# Patient Record
Sex: FEMALE | Race: WHITE | NOT HISPANIC OR LATINO | Employment: FULL TIME | URBAN - METROPOLITAN AREA
[De-identification: names, ages, dates, MRNs, and addresses within clinical notes are randomized per-mention and may not be internally consistent; named-entity substitution may affect disease eponyms.]

---
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
## 2019-01-08 ENCOUNTER — OFFICE VISIT (OUTPATIENT)
Dept: OBSTETRICS AND GYNECOLOGY | Facility: CLINIC | Age: 19
End: 2019-01-08

## 2019-01-08 VITALS
DIASTOLIC BLOOD PRESSURE: 86 MMHG | HEART RATE: 85 BPM | HEIGHT: 65 IN | WEIGHT: 153 LBS | BODY MASS INDEX: 25.49 KG/M2 | SYSTOLIC BLOOD PRESSURE: 138 MMHG

## 2019-01-08 DIAGNOSIS — Z30.011 ENCOUNTER FOR INITIAL PRESCRIPTION OF CONTRACEPTIVE PILLS: ICD-10-CM

## 2019-01-08 DIAGNOSIS — N92.6 IRREGULAR MENSES: ICD-10-CM

## 2019-01-08 DIAGNOSIS — Z11.3 SCREEN FOR STD (SEXUALLY TRANSMITTED DISEASE): Primary | ICD-10-CM

## 2019-01-08 PROCEDURE — 99214 OFFICE O/P EST MOD 30 MIN: CPT | Performed by: OBSTETRICS & GYNECOLOGY

## 2019-01-08 RX ORDER — NORGESTIMATE AND ETHINYL ESTRADIOL 0.25-0.035
1 KIT ORAL DAILY
Qty: 28 TABLET | Refills: 12 | Status: SHIPPED | OUTPATIENT
Start: 2019-01-08 | End: 2020-07-15

## 2019-01-08 NOTE — PROGRESS NOTES
Subjective   Obie Jensen is a 18 y.o. female.   CC: pt here for irregular cycles.   History of Present Illness   Patient was last seen in June 2017 for similar complaints.  She states that at that time she was started on birth control pills, but she only took them for a month.  After that her periods self regulated and had been normal until 12/2018.  She reports that she had a normal period at the beginning of December, and then she bled again for about another 10 days in the middle of the month.  Bleeding stopped about 2 weeks ago.  She was not having any pain with the bleeding.  Patient is sexually active.  She occasionally uses condoms.  She denies any history of sexually transmitted diseases.  She is interested in starting on birth control again.  Patient denies any headache or visual disturbances.  No nipple discharge.  She denies hot or cold intolerance.  She had a significant weight loss around the time of her last visit, but her weight has been stable for the last year and a half.  She does it after her last visit in June 2017, she did follow-up with her primary care physician who checked thyroid function and hormones which were normal.    Current Outpatient Medications on File Prior to Visit   Medication Sig   • [DISCONTINUED] norgestimate-ethinyl estradiol (TRINESSA, 28,) 0.18/0.215/0.25 MG-35 MCG per tablet Take 1 tablet by mouth Daily.     No current facility-administered medications on file prior to visit.        Allergies   Allergen Reactions   • Penicillins Unknown (See Comments)   • Latex Rash     The following portions of the patient's history were reviewed and updated as appropriate: allergies, current medications, past family history, past medical history, past social history, past surgical history and problem list.    Review of Systems  General: No fever or chills  Constitutional: No weight loss or gain, no hair loss  HENT: No headache, no hearing loss, no tinnitus  Eyes: normal vision, no eye  "pain  Lungs: No cough, no shortness of breath  Heart: No chest pain, no palpitations  Abdomen: No nausea, vomiting, constipation or diarrhea  : No dysuria, no hematuria  Skin: No rashes  Lymph: No swelling  Neuro: No parathesia, no weakness  Psych: Normal though content, no hallucinations, no SI/HI    Objective   Physical Exam  Vitals:    01/08/19 1457   BP: 138/86   Pulse: 85   Weight: 69.4 kg (153 lb)   Height: 165.1 cm (65\")     Gen: No acute distress, awake and oriented times three  Abdomen: soft, nontender, no masses or hernia, no hepatosplenomegaly, non distended, normoactive bowel sounds  Pelvic: Exam performed in the presence of a female chaperone  Patient has provided verbal consent to proceed with exam.  Normal external female genitalia, no lesions  Urethra: Normal meatus, no caruncle  Bladder: nontender  Vagina: No blood; mild amount white DC noted  Cervix: No cervical motion tenderness, no lesions, no active bleeding, nonfriable  Uterus: Anteverted, normal size and shape, nontender  Adnexa: No masses or tenderness  External anal exam: Normal appearance, no lesions or hemorrhoids  Rectal: Deferred  Psych: Good judgement and insight, normal affect and mood    Assessment/Plan   Diagnoses and all orders for this visit:    Screen for STD (sexually transmitted disease)  -     Chlamydia trachomatis, Neisseria gonorrhoeae, Trichomonas vaginalis, PCR - Swab, Vagina    Encounter for initial prescription of contraceptive pills  -     norgestimate-ethinyl estradiol (SPRINTEC 28) 0.25-35 MG-MCG per tablet; Take 1 tablet by mouth Daily.    Irregular menses  -     Chlamydia trachomatis, Neisseria gonorrhoeae, Trichomonas vaginalis, PCR - Swab, Vagina  -     norgestimate-ethinyl estradiol (SPRINTEC 28) 0.25-35 MG-MCG per tablet; Take 1 tablet by mouth Daily.    No obvious cause of her irregular menses.  This may just be normal variation in her cycle.  We will check cultures to exclude an infectious source, as the " patient is sexually active and occasionally only occasionally uses condoms.  We encouraged more regular condom use.  Patient like to start on birth control pills.  Risks, benefits, alternatives, and side effects were discussed.  Instructions for use were given to the patient.  She will start on Sprintec after her next menses.  We discussed a Sunday start.  Patient needs to return after she turns 19 for annual exam/Pap smear.

## 2019-01-10 ENCOUNTER — TELEPHONE (OUTPATIENT)
Dept: OBSTETRICS AND GYNECOLOGY | Facility: CLINIC | Age: 19
End: 2019-01-10

## 2019-01-10 LAB
C TRACH RRNA SPEC QL NAA+PROBE: NEGATIVE
N GONORRHOEA RRNA SPEC QL NAA+PROBE: NEGATIVE
T VAGINALIS RRNA VAG QL NAA+PROBE: NEGATIVE

## 2019-01-10 NOTE — TELEPHONE ENCOUNTER
Pt aware. RYAN      ----- Message from Yousif Guerrero MD sent at 1/10/2019  9:24 AM EST -----  Notify the patient that her gonorrhea and chlamydia tests were negative

## 2020-07-15 ENCOUNTER — OFFICE VISIT (OUTPATIENT)
Dept: OBSTETRICS AND GYNECOLOGY | Facility: CLINIC | Age: 20
End: 2020-07-15

## 2020-07-15 VITALS — BODY MASS INDEX: 21.99 KG/M2 | HEIGHT: 65 IN | WEIGHT: 132 LBS

## 2020-07-15 DIAGNOSIS — Z12.4 PAPANICOLAOU SMEAR: ICD-10-CM

## 2020-07-15 DIAGNOSIS — N92.6 IRREGULAR MENSES: Primary | ICD-10-CM

## 2020-07-15 DIAGNOSIS — Z11.3 SCREENING FOR STD (SEXUALLY TRANSMITTED DISEASE): ICD-10-CM

## 2020-07-15 LAB
B-HCG UR QL: NEGATIVE
INTERNAL NEGATIVE CONTROL: NEGATIVE
INTERNAL POSITIVE CONTROL: POSITIVE
Lab: NORMAL

## 2020-07-15 PROCEDURE — 81025 URINE PREGNANCY TEST: CPT | Performed by: NURSE PRACTITIONER

## 2020-07-15 PROCEDURE — 99214 OFFICE O/P EST MOD 30 MIN: CPT | Performed by: NURSE PRACTITIONER

## 2020-07-15 RX ORDER — NAPROXEN 500 MG/1
500 TABLET ORAL
COMMUNITY
Start: 2020-07-12 | End: 2020-07-26

## 2020-07-15 RX ORDER — HYDROCODONE BITARTRATE AND ACETAMINOPHEN 7.5; 325 MG/1; MG/1
TABLET ORAL
COMMUNITY
Start: 2020-05-22 | End: 2021-04-15

## 2020-07-15 NOTE — PROGRESS NOTES
"Chief Complaint   Patient presents with   • Establish Care     Annual exam. Pt also c/o irregular periods.         SUBJECTIVE:     Obie Jensen is a 20 y.o.  who presents with irregular menses. This is a new problem. LMP 18, started spotting 18. Initially states periods are months apart, further questioning reveals regular monthly periods lasting 3-4 days with frequent episodes of spotting. Denies pelvic or vaginal pain. Denies vaginal discharge, itching, burning, or dysuria.    Current every day smoker, denies hx of migraines, denies hx of DVT    She has previously used nexplanon and sprintec for contraception, however she is not interested in contraceptives at this time.    Using condoms for contraception    She denies any hx of STD    This is my first time meeting Obie Jensen      Past Medical History:   Diagnosis Date   • Weight loss, unintentional       Past Surgical History:   Procedure Laterality Date   • FOOT SURGERY Bilateral    • HAND SURGERY        Social History     Tobacco Use   • Smoking status: Current Every Day Smoker     Packs/day: 4.00     Types: Cigars   • Smokeless tobacco: Never Used   Substance Use Topics   • Alcohol use: No   • Drug use: No     OB History    Para Term  AB Living   0 0 0 0 0 0   SAB TAB Ectopic Molar Multiple Live Births   0 0 0   0          Review of Systems   Gastrointestinal: Negative for abdominal distention and abdominal pain.   Genitourinary: Positive for menstrual problem. Negative for dyspareunia, dysuria, genital sores, hematuria, pelvic pain, vaginal bleeding, vaginal discharge and vaginal pain.   Musculoskeletal: Negative for back pain and gait problem.   Neurological: Negative for dizziness and headaches.   Psychiatric/Behavioral: Negative for dysphoric mood.       OBJECTIVE:   Vitals:    07/15/20 1019   Weight: 59.9 kg (132 lb)   Height: 165.1 cm (65\")        Physical Exam   Constitutional: She is oriented to person, place, and " time. She appears well-developed and well-nourished.   HENT:   Head: Normocephalic and atraumatic.   Eyes: Pupils are equal, round, and reactive to light.   Neck: Normal range of motion. Neck supple. No thyromegaly present.   Cardiovascular: Normal rate, regular rhythm and normal heart sounds.   No murmur heard.  Pulmonary/Chest: Effort normal and breath sounds normal. No respiratory distress. She has no wheezes. Right breast exhibits no inverted nipple, no mass, no nipple discharge, no skin change and no tenderness. Left breast exhibits no inverted nipple, no mass, no nipple discharge, no skin change and no tenderness. No breast swelling, tenderness, discharge or bleeding. Breasts are symmetrical.   Abdominal: Soft. She exhibits no distension and no mass. There is no tenderness. There is no rebound and no guarding. No hernia. Hernia confirmed negative in the right inguinal area and confirmed negative in the left inguinal area.   Genitourinary: Uterus normal. No labial fusion. There is no rash, tenderness, lesion, injury or Bartholin's cyst on the right labia. There is no rash, tenderness, lesion, injury or Bartholin's cyst on the left labia.   Cervix is nulliparous. Cervix does not exhibit motion tenderness, discharge, friability, lesion, polyp, nabothian cyst, eversion or pinkness. Right adnexum displays no mass, no tenderness and no fullness. Right adnexum is present and palpable.Left adnexum displays no mass, no tenderness and no fullness. Left adnexum is present and palpable.Vagina exhibits no lesion and no loss of rugae. No erythema, tenderness or bleeding in the vagina. No foreign body in the vagina. No signs of injury around the vagina. No vaginal discharge found. No cystocele or rectocele present.  Musculoskeletal: Normal range of motion. She exhibits no edema.   Lymphadenopathy:     She has no cervical adenopathy.        Right: No inguinal adenopathy present.        Left: No inguinal adenopathy present.    Neurological: She is alert and oriented to person, place, and time. No cranial nerve deficit. Coordination normal.   Skin: Skin is warm and dry. No erythema.   Psychiatric: She has a normal mood and affect. Her behavior is normal. Judgment and thought content normal.   Vitals reviewed.      ASSESSMENT:   1) Irregular menses  2) Screening for STD    PLAN:   1. NuSwab + today, will all with results  2. TSH today  3. Pap smear collected today, no previous pap testing completed, reviewed recommendations with pt  4. Reviewed possible causes of intermenstrual spotting as well as treatments. Will rule out STD and thyroid issues. Will consider transvaginal u/s if no improvement in symptoms  5. Urine pregnancy test negative in office today  6.Obie Jensen  reports that she has been smoking cigars. She has been smoking about 4.00 packs per day. She has never used smokeless tobacco.. I have educated her on the risk of diseases from using tobacco products such as cancer, COPD and heart diease.   I advised her to quit and she is not willing to quit.  I spent 3  minutes counseling the patient.    Follow up: PRN no improvement in symptoms, PRN, and annually       Unique Shah, APRN  7/15/2020  10:27

## 2020-07-20 LAB
CONV .: NORMAL
CYTOLOGIST CVX/VAG CYTO: NORMAL
CYTOLOGY CVX/VAG DOC CYTO: NORMAL
CYTOLOGY CVX/VAG DOC THIN PREP: NORMAL
DX ICD CODE: NORMAL
HIV 1 & 2 AB SER-IMP: NORMAL
OTHER STN SPEC: NORMAL
STAT OF ADQ CVX/VAG CYTO-IMP: NORMAL

## 2020-07-24 ENCOUNTER — TELEPHONE (OUTPATIENT)
Dept: OBSTETRICS AND GYNECOLOGY | Facility: CLINIC | Age: 20
End: 2020-07-24

## 2020-07-24 LAB
A VAGINAE DNA VAG QL NAA+PROBE: ABNORMAL SCORE
BVAB2 DNA VAG QL NAA+PROBE: ABNORMAL SCORE
C ALBICANS DNA VAG QL NAA+PROBE: NEGATIVE
C GLABRATA DNA VAG QL NAA+PROBE: NEGATIVE
C TRACH DNA VAG QL NAA+PROBE: NEGATIVE
MEGA1 DNA VAG QL NAA+PROBE: ABNORMAL SCORE
N GONORRHOEA DNA VAG QL NAA+PROBE: NEGATIVE
T VAGINALIS DNA VAG QL NAA+PROBE: NEGATIVE

## 2020-07-24 RX ORDER — METRONIDAZOLE 500 MG/1
500 TABLET ORAL 2 TIMES DAILY
Qty: 14 TABLET | Refills: 0 | Status: SHIPPED | OUTPATIENT
Start: 2020-07-24 | End: 2020-07-31

## 2020-07-24 NOTE — TELEPHONE ENCOUNTER
----- Message from LEANDRA Bojorquez sent at 7/24/2020 12:40 PM EDT -----  Please let the pt know that her vaginal cultures returned +BV, I have sent a prescription to her pharmacy to treat. She should not drink alcohol while taking flagyl    Thanks

## 2021-04-15 ENCOUNTER — OFFICE VISIT (OUTPATIENT)
Dept: OBSTETRICS AND GYNECOLOGY | Facility: CLINIC | Age: 21
End: 2021-04-15

## 2021-04-15 VITALS
DIASTOLIC BLOOD PRESSURE: 70 MMHG | BODY MASS INDEX: 23.66 KG/M2 | HEIGHT: 65 IN | SYSTOLIC BLOOD PRESSURE: 120 MMHG | WEIGHT: 142 LBS

## 2021-04-15 DIAGNOSIS — N83.202 CYST OF LEFT OVARY: ICD-10-CM

## 2021-04-15 DIAGNOSIS — N73.0 ACUTE PID (PELVIC INFLAMMATORY DISEASE): ICD-10-CM

## 2021-04-15 DIAGNOSIS — A54.9 GONORRHEA: Primary | ICD-10-CM

## 2021-04-15 PROCEDURE — 99214 OFFICE O/P EST MOD 30 MIN: CPT | Performed by: NURSE PRACTITIONER

## 2021-04-15 NOTE — PROGRESS NOTES
Chief Complaint   Patient presents with   • Follow-up     e/r follow up for PID        SUBJECTIVE:     Obie Jensen is a 21 y.o.  who presents for f/u ED visit from 3/27/21 at [facility] I am able to see some of these records and have reviewed them with the patient. She was diagnosed with PID, gonorrhea, and left ovarian cyst. She was also dx with c-diff and UTI. She has no complaints today. She was treated with rocephin and doxycycline. She did not  and take doxycycline for PID. She denies any current pelvic pain, fevers, chills, N&V, denies dysuria, vaginal discharge or odor. WBC elevated in ED, but was normal at PCP f/u 21 This is a new problem. LMP 3/22/21    I reviewed the u/s results with the pt [id number] hemorrhagic left ovarian cyst. Discussed with pt that there is no indication to repeat the u/s with a cyst of this size and if we did repeat, I would prefer to wait 6-8 weeks from first u/s. She feels strongly about repeating this u/s and desires to do so in approx 4 weeks in our office.     She is unsure if her partner was tested and/or treated for gonorrhea, she has not been sexually active since positive result.    Past Medical History:   Diagnosis Date   • Weight loss, unintentional       Past Surgical History:   Procedure Laterality Date   • FOOT SURGERY Bilateral    • HAND SURGERY        Social History     Tobacco Use   • Smoking status: Current Every Day Smoker     Packs/day: 4.00     Types: Cigars   • Smokeless tobacco: Never Used   Substance Use Topics   • Alcohol use: No   • Drug use: No     OB History    Para Term  AB Living   0 0 0 0 0 0   SAB TAB Ectopic Molar Multiple Live Births   0 0 0   0          Review of Systems   Constitutional: Negative for chills, fatigue and fever.   Gastrointestinal: Negative for abdominal distention, abdominal pain, blood in stool, constipation, diarrhea, nausea and vomiting.   Genitourinary: Negative for dyspareunia, dysuria, flank pain,  "frequency, menstrual problem, pelvic pain, vaginal bleeding, vaginal discharge and vaginal pain.   Musculoskeletal: Negative for back pain and gait problem.   Skin: Negative for rash.   Neurological: Negative for dizziness and headaches.   Hematological: Does not bruise/bleed easily.   Psychiatric/Behavioral: Negative for behavioral problems.       OBJECTIVE:   Vitals:    04/15/21 1335   BP: 120/70   Weight: 64.4 kg (142 lb)   Height: 165.1 cm (65\")        Physical Exam  Vitals and nursing note reviewed.   Constitutional:       Appearance: Normal appearance.   HENT:      Head: Normocephalic and atraumatic.   Eyes:      Pupils: Pupils are equal, round, and reactive to light.   Cardiovascular:      Rate and Rhythm: Normal rate.   Pulmonary:      Effort: Pulmonary effort is normal.   Abdominal:      General: Abdomen is flat. There is no distension.      Palpations: Abdomen is soft. There is no mass.      Tenderness: There is no abdominal tenderness. There is no guarding.      Hernia: No hernia is present. There is no hernia in the left inguinal area or right inguinal area.   Genitourinary:     General: Normal vulva.      Exam position: Lithotomy position.      Pubic Area: No rash or pubic lice.       Labia:         Right: No rash, tenderness, lesion or injury.         Left: No rash, tenderness, lesion or injury.       Urethra: No prolapse, urethral pain, urethral swelling or urethral lesion.      Vagina: No signs of injury and foreign body. Vaginal discharge (thick, white, clumpy, consistent with yeast ) present. No erythema, tenderness, bleeding, lesions or prolapsed vaginal walls.      Cervix: No cervical motion tenderness, discharge, friability, lesion, erythema, cervical bleeding or eversion.      Uterus: Not deviated, not enlarged, not fixed, not tender and no uterine prolapse.       Adnexa:         Right: No mass, tenderness or fullness.          Left: No mass, tenderness or fullness.     Musculoskeletal:         " General: Normal range of motion.      Cervical back: Normal range of motion.   Lymphadenopathy:      Lower Body: No right inguinal adenopathy. No left inguinal adenopathy.   Skin:     General: Skin is warm and dry.   Neurological:      General: No focal deficit present.      Mental Status: She is alert and oriented to person, place, and time.      Cranial Nerves: No cranial nerve deficit.   Psychiatric:         Mood and Affect: Mood normal.         Behavior: Behavior normal.         Thought Content: Thought content normal.         Judgment: Judgment normal.         ASSESSMENT:   1) F/U ED  2) Gonorrhea  3) Left ovarian cyst  4) PID    PLAN:   Pelvic pain and symptoms all now resolved  NuSwab+ recollected  Discharge consistent with yeast noted, she denies and symptoms  Pelvic pain resolved, I reviewed recommendations surrounding indications for repeat u/s with pt, she desires to complete in 4 weeks  Reviewed torsion precautions, encouraged to call with any return of pelvic pain, fevers, chills, or N&V  Transvaginal u/s ordered for 4 weeks.  Encouraged condoms with intercourse     Follow up: 4 week and PRN    I have spent 35 min in face to face time with the patient and 35 min of this time was spent in counseling on the above stated issues and reviewing previous records.       Unique Shah, APRN  4/15/2021  13:46 EDT

## 2021-04-18 LAB
A VAGINAE DNA VAG QL NAA+PROBE: NORMAL SCORE
BVAB2 DNA VAG QL NAA+PROBE: NORMAL SCORE
C ALBICANS DNA VAG QL NAA+PROBE: NEGATIVE
C GLABRATA DNA VAG QL NAA+PROBE: NEGATIVE
C TRACH DNA VAG QL NAA+PROBE: NEGATIVE
MEGA1 DNA VAG QL NAA+PROBE: NORMAL SCORE
N GONORRHOEA DNA VAG QL NAA+PROBE: NEGATIVE
T VAGINALIS DNA VAG QL NAA+PROBE: NEGATIVE

## 2021-05-17 ENCOUNTER — TELEPHONE (OUTPATIENT)
Dept: OBSTETRICS AND GYNECOLOGY | Facility: CLINIC | Age: 21
End: 2021-05-17

## 2021-05-26 ENCOUNTER — TELEPHONE (OUTPATIENT)
Dept: OBSTETRICS AND GYNECOLOGY | Facility: CLINIC | Age: 21
End: 2021-05-26

## 2021-11-18 ENCOUNTER — OFFICE VISIT (OUTPATIENT)
Dept: OBSTETRICS AND GYNECOLOGY | Facility: CLINIC | Age: 21
End: 2021-11-18

## 2021-11-18 VITALS
HEART RATE: 62 BPM | DIASTOLIC BLOOD PRESSURE: 73 MMHG | WEIGHT: 153.6 LBS | SYSTOLIC BLOOD PRESSURE: 113 MMHG | BODY MASS INDEX: 25.56 KG/M2

## 2021-11-18 DIAGNOSIS — Z01.419 WOMEN'S ANNUAL ROUTINE GYNECOLOGICAL EXAMINATION: Primary | ICD-10-CM

## 2021-11-18 DIAGNOSIS — Z23 NEED FOR HPV VACCINATION: ICD-10-CM

## 2021-11-18 DIAGNOSIS — Z11.3 SCREENING FOR STD (SEXUALLY TRANSMITTED DISEASE): ICD-10-CM

## 2021-11-18 PROCEDURE — 3008F BODY MASS INDEX DOCD: CPT | Performed by: NURSE PRACTITIONER

## 2021-11-18 PROCEDURE — 2014F MENTAL STATUS ASSESS: CPT | Performed by: NURSE PRACTITIONER

## 2021-11-18 PROCEDURE — 96372 THER/PROPH/DIAG INJ SC/IM: CPT | Performed by: NURSE PRACTITIONER

## 2021-11-18 PROCEDURE — 90651 9VHPV VACCINE 2/3 DOSE IM: CPT | Performed by: NURSE PRACTITIONER

## 2021-11-18 PROCEDURE — 99395 PREV VISIT EST AGE 18-39: CPT | Performed by: NURSE PRACTITIONER

## 2021-11-18 NOTE — PROGRESS NOTES
GYN Annual Exam     Chief Complaint   Patient presents with   • Gynecologic Exam     annual. Pap- 2020       HPI    Obie Jensen is a 21 y.o. female who presents for annual well woman exam.  She is sexually active. Periods are regular every 28-30 days, lasting 5 days. Dysmenorrhea:none. Cyclic symptoms include none. No intermenstrual bleeding, spotting, or discharge. Performing SBE:occas      OB History        0    Para   0    Term   0       0    AB   0    Living   0       SAB   0    IAB   0    Ectopic   0    Molar        Multiple   0    Live Births                    LMP- 10/26/21  Current contraception: none  Last Pap- 2020 negative  History of abnormal Pap smear: no  History of STD-gonorrhea  Family history of uterine, colon or ovarian cancer: no  Family history of breast cancer: no  Gardasil Vaccine: no    Past Medical History:   Diagnosis Date   • Weight loss, unintentional        Past Surgical History:   Procedure Laterality Date   • FOOT SURGERY Bilateral    • HAND SURGERY         No current outpatient medications on file.    Allergies   Allergen Reactions   • Penicillins Unknown (See Comments) and Other (See Comments)     Mother has hx of pcn allergy, patient has never had pcn  Mother has hx of pcn allergy, patient has never had pcn  Other reaction(s): Unknown (See Comments)  Mother has hx of pcn allergy, patient has never had pcn   • Latex Rash       Social History     Tobacco Use   • Smoking status: Current Every Day Smoker     Packs/day: 4.00     Types: Cigars   • Smokeless tobacco: Never Used   Substance Use Topics   • Alcohol use: No   • Drug use: No       Family History   Problem Relation Age of Onset   • Thyroid cancer Father        Review of Systems   Constitutional: Negative for chills, fatigue and fever.   Gastrointestinal: Negative for abdominal distention, abdominal pain, nausea and vomiting.   Genitourinary: Negative for breast discharge, breast lump, breast pain, dysuria,  menstrual problem, pelvic pain, pelvic pressure, vaginal bleeding, vaginal discharge and vaginal pain.   Musculoskeletal: Negative for gait problem.   Skin: Negative for rash.   Neurological: Negative for dizziness and headache.   Psychiatric/Behavioral: Negative for behavioral problems.       /73   Pulse 62   Wt 69.7 kg (153 lb 9.6 oz)   BMI 25.56 kg/m²     Physical Exam  Constitutional:       Appearance: Normal appearance.   Genitourinary:      Vulva, bladder and urethral meatus normal.      No lesions in the vagina.      Right Labia: No rash, tenderness, lesions, skin changes or Bartholin's cyst.     Left Labia: No tenderness, lesions, skin changes, Bartholin's cyst or rash.     No labial fusion noted.      No inguinal adenopathy present in the right or left side.     No vaginal discharge, erythema, tenderness, bleeding or ulceration.      No vaginal prolapse present.     No vaginal atrophy present.       Right Adnexa: not tender, not full, not palpable, no mass present and not absent.     Left Adnexa: not tender, not full, not palpable, no mass present and not absent.     No cervical motion tenderness, discharge, friability, lesion, polyp, nabothian cyst or eversion.      Uterus is not enlarged, fixed, tender, irregular or prolapsed.      No uterine mass detected.     No urethral tenderness or mass present.      Pelvic exam was performed with patient in the lithotomy position.   Breasts: Breasts are symmetrical.      Right: Present. No swelling, bleeding, inverted nipple, mass, nipple discharge, skin change, tenderness, breast implant, axillary adenopathy or supraclavicular adenopathy.      Left: Present. No swelling, bleeding, inverted nipple, mass, nipple discharge, skin change, tenderness, breast implant, axillary adenopathy or supraclavicular adenopathy.       HENT:      Head: Normocephalic and atraumatic.   Eyes:      Pupils: Pupils are equal, round, and reactive to light.   Cardiovascular:       Rate and Rhythm: Normal rate.   Pulmonary:      Effort: Pulmonary effort is normal.   Abdominal:      General: There is no distension.      Palpations: Abdomen is soft. There is no mass.      Tenderness: There is no abdominal tenderness. There is no guarding.      Hernia: No hernia is present. There is no hernia in the left inguinal area or right inguinal area.   Musculoskeletal:         General: Normal range of motion.      Cervical back: Normal range of motion and neck supple. No tenderness.   Lymphadenopathy:      Cervical: No cervical adenopathy.      Upper Body:      Right upper body: No supraclavicular, axillary or pectoral adenopathy.      Left upper body: No supraclavicular, axillary or pectoral adenopathy.      Lower Body: No right inguinal adenopathy. No left inguinal adenopathy.   Neurological:      General: No focal deficit present.      Mental Status: She is alert and oriented to person, place, and time.      Cranial Nerves: No cranial nerve deficit.   Skin:     General: Skin is warm and dry.   Psychiatric:         Mood and Affect: Mood normal.         Behavior: Behavior normal.         Thought Content: Thought content normal.         Judgment: Judgment normal.   Vitals and nursing note reviewed.         Assessment   Diagnoses and all orders for this visit:    1. Women's annual routine gynecological examination (Primary)    2. Screening for STD (sexually transmitted disease)  -     Chlamydia trachomatis, Neisseria gonorrhoeae, Trichomonas vaginalis, PCR - Swab, Cervix    3. Need for HPV vaccination       Plan   1. Well woman exam: Pap collected N/A, up to date. Recommend MVI daily.    2. Contraception: declines  3. STD: Enc condoms. Desires STD screen today- Yes. NuSwab  4. Smoking status: Current everyday smoker, advised cessation  5.  Encouraged annual mammogram screening starting at age 40. Instructed on how to perform SBE. Encouraged breast health self awareness.  6.    Encouraged 150 minutes of  exercise per week if not medially contraindicated.   7.    Patient's Body mass index is 25.56 kg/m². indicating that she is overweight by BMI  (BMI 25-29.9).   8.  Gardasil vaccine: We discussed that this is a vaccine to protect against the human papilloma virus. HPV can cause cervical cancer, as well as genital warts. The vaccine reduces the chance of cervical cancer by up to 70 percent. It also can protect against cancers of the vulva, vagina, anus and possibly laryngeal cancers. The vaccine is recommended for girls and boys the recommended age is 11-12, with catch up vaccination for anyone over that age until the age of 27. There are 3 vaccines in the series.       Follow Up one year or PRN    Unique Shah, LEANDRA  11/18/2021  13:01 EST

## 2021-11-21 LAB
C TRACH RRNA SPEC QL NAA+PROBE: NEGATIVE
N GONORRHOEA RRNA SPEC QL NAA+PROBE: NEGATIVE
T VAGINALIS DNA SPEC QL NAA+PROBE: NEGATIVE

## 2023-01-24 ENCOUNTER — OFFICE VISIT (OUTPATIENT)
Dept: OBSTETRICS AND GYNECOLOGY | Facility: CLINIC | Age: 23
End: 2023-01-24
Payer: MEDICAID

## 2023-01-24 VITALS
BODY MASS INDEX: 25.66 KG/M2 | WEIGHT: 154 LBS | SYSTOLIC BLOOD PRESSURE: 116 MMHG | HEIGHT: 65 IN | DIASTOLIC BLOOD PRESSURE: 74 MMHG

## 2023-01-24 DIAGNOSIS — Z12.4 SCREENING FOR MALIGNANT NEOPLASM OF CERVIX: ICD-10-CM

## 2023-01-24 DIAGNOSIS — Z01.411 ENCOUNTER FOR GYNECOLOGICAL EXAMINATION WITH ABNORMAL FINDING: Primary | ICD-10-CM

## 2023-01-24 DIAGNOSIS — Z11.3 SCREEN FOR SEXUALLY TRANSMITTED DISEASES: ICD-10-CM

## 2023-01-24 PROCEDURE — 99395 PREV VISIT EST AGE 18-39: CPT | Performed by: OBSTETRICS & GYNECOLOGY

## 2023-01-24 PROCEDURE — 2014F MENTAL STATUS ASSESS: CPT | Performed by: OBSTETRICS & GYNECOLOGY

## 2023-01-24 PROCEDURE — 3008F BODY MASS INDEX DOCD: CPT | Performed by: OBSTETRICS & GYNECOLOGY

## 2023-01-24 RX ORDER — DUPILUMAB 300 MG/2ML
INJECTION, SOLUTION SUBCUTANEOUS
COMMUNITY
Start: 2023-01-20

## 2023-01-24 NOTE — PROGRESS NOTES
"Chief Complaint  Gynecologic Exam (AE)    Subjective        [facility]  History of Present Illness  Patient is here for annual exam.  She is without complaints today.  Last Pap smear has been at least 3 years.  She reports regular menses.  She does not desire contraception at this time.  She reports that she did complete the Gardasil series as a child.    The following portions of the patient's history were reviewed and updated as appropriate: allergies, current medications, past family history, past medical history, past social history, past surgical history, and problem list.    Objective   Vital Signs:  /74   Ht 165.1 cm (65\")   Wt 69.9 kg (154 lb)   BMI 25.63 kg/m²   Estimated body mass index is 25.63 kg/m² as calculated from the following:    Height as of this encounter: 165.1 cm (65\").    Weight as of this encounter: 69.9 kg (154 lb).             Physical Exam   Exam performed in the presence of a female chaperone  Patient has provided verbal consent to proceed with exam.    Gen: No acute distress, awake and oriented times three  HENT: Normocephalic, atraumatic, Moist mucous membranes  Eyes: PERRLA, EOMI  Lungs: Normal work of breathing, lungs clear bilaterally  Breast: Symmetrical. No skin changes or nipple retractions. No lumps or masses bilaterally. No tenderness bilaterally.  Abdomen: soft, nontender, no masses or hernia, no hepatosplenomegaly, non distended, normoactive bowel sounds  Normal external female genitalia, no lesions  Urethra: Normal meatus, no caruncle  Bladder: nontender  Vagina: No blood or discharge  Cervix: No cervical motion tenderness, no lesions, no active bleeding, nonfriable  Uterus: Anteverted, normal size and shape, nontender  Adnexa: No masses or tenderness  External anal exam: Normal appearance, no lesions or hemorrhoids  Rectal: Deferred  Skin: Warm and dry, no rashes  Psych: Good judgement and insight, " normal affect and mood  Neuro: CN 2-12 intact, no gross deficits      Result Review :                   Assessment and Plan   Diagnoses and all orders for this visit:    1. Encounter for gynecological examination with abnormal finding (Primary)    2. Screening for malignant neoplasm of cervix  -     IGP,CtNgTv,rfx Apt HPV All    3. Screen for sexually transmitted diseases  -     IGP,CtNgTv,rfx Apt HPV All  -     HIV-1 / O / 2 Ag / Antibody 4th Generation  -     Hepatitis B Surface Antigen  -     HCV Antibody Rfx To Qnt PCR  -     RPR    Pap - Ordered today.  STD screening - Cultures performed today. Labs offered to pt and patient accepts.  Contraception - Options discussed with pt at length. Risks, benefits, side effects, and alternatives to various forms of hormonal, nonhormonal and barrier methods discussed. Pt declines.   Patient completed the Gardasil series as a child  Safe sex practices encouraged with patient.  Breast cancer screening. Patient encouraged to perform routine self breast exams. Recommend yearly clinical breast exam and mammogram starting at age 40.  Recommend pt take calcium and vitamin D supplementation as well as prenatal vitamin or folic acid if she is attempting to conceive.  Encourage aerobic exercise with at least 30 minutes 5 days/wk.  Avoid excessive alcohol use.  Patient is advised to call the office for results after 1 week if she has not seen or heard the results of any tests ordered or performed today.           Follow Up   Return in about 1 year (around 1/24/2024) for Annual physical.  Patient was given instructions and counseling regarding her condition or for health maintenance advice. Please see specific information pulled into the AVS if appropriate.

## 2023-01-25 LAB
HBV SURFACE AG SERPL QL IA: NEGATIVE
HCV AB S/CO SERPL IA: <0.1 S/CO RATIO (ref 0–0.9)
HIV 1+2 AB+HIV1 P24 AG SERPL QL IA: NON REACTIVE
RPR SER QL: NORMAL

## 2023-01-27 LAB
C TRACH RRNA CVX QL NAA+PROBE: NEGATIVE
CONV .: NORMAL
CYTOLOGIST CVX/VAG CYTO: NORMAL
CYTOLOGY CVX/VAG DOC CYTO: NORMAL
CYTOLOGY CVX/VAG DOC THIN PREP: NORMAL
DX ICD CODE: NORMAL
HIV 1 & 2 AB SER-IMP: NORMAL
N GONORRHOEA RRNA CVX QL NAA+PROBE: NEGATIVE
OTHER STN SPEC: NORMAL
STAT OF ADQ CVX/VAG CYTO-IMP: NORMAL
T VAGINALIS RRNA SPEC QL NAA+PROBE: NEGATIVE

## 2023-03-31 ENCOUNTER — OFFICE VISIT (OUTPATIENT)
Dept: OBSTETRICS AND GYNECOLOGY | Facility: CLINIC | Age: 23
End: 2023-03-31
Payer: MEDICAID

## 2023-03-31 VITALS
HEIGHT: 65 IN | SYSTOLIC BLOOD PRESSURE: 121 MMHG | WEIGHT: 150.8 LBS | HEART RATE: 89 BPM | BODY MASS INDEX: 25.12 KG/M2 | DIASTOLIC BLOOD PRESSURE: 77 MMHG

## 2023-03-31 DIAGNOSIS — N97.9 FEMALE INFERTILITY: Primary | ICD-10-CM

## 2023-03-31 DIAGNOSIS — Z79.2 NEED FOR PROPHYLACTIC ANTIBIOTIC: ICD-10-CM

## 2023-03-31 RX ORDER — PNV NO.95/FERROUS FUM/FOLIC AC 28MG-0.8MG
1 TABLET ORAL DAILY
Qty: 30 TABLET | Refills: 11 | Status: SHIPPED | OUTPATIENT
Start: 2023-03-31

## 2023-03-31 RX ORDER — DOXYCYCLINE HYCLATE 100 MG/1
100 CAPSULE ORAL 2 TIMES DAILY
Qty: 10 CAPSULE | Refills: 0 | Status: SHIPPED | OUTPATIENT
Start: 2023-03-31 | End: 2023-04-05

## 2023-03-31 NOTE — PROGRESS NOTES
"Chief Complaint  Family Planning (Patient is here to discuss conceiving- been trying for 6 months. Was told by ant that she has a small cyst and she is wondering if it could the cause of not being pregnant yet )    Subjective        Obie Jensen [facility]  History of Present Illness  Patient is concerned about difficulty with trying to conceive.  She reports that she and her partner have been trying for about the last 6-8 months.  Patient reports menses that are regular and predictable. Positive molimina.  She has never tried home ovulation induction kits.  She does have a history of gonorrhea and PID many years ago.  She has never had any evaluation of her fallopian tubes.  She denies significant history of pelvic or abdominal surgery.  She has never had biopsies or surgeries of the cervix.  Her partner is about age 30 and is in generally normal health.  He has never had any children from a previous relationship.  He has never had a semen analysis performed.    The following portions of the patient's history were reviewed and updated as appropriate: allergies, current medications, past family history, past medical history, past social history, past surgical history, and problem list.    Objective   Vital Signs:  /77   Pulse 89   Ht 165.1 cm (65\")   Wt 68.4 kg (150 lb 12.8 oz)   BMI 25.09 kg/m²   Estimated body mass index is 25.09 kg/m² as calculated from the following:    Height as of this encounter: 165.1 cm (65\").    Weight as of this encounter: 68.4 kg (150 lb 12.8 oz).             Physical Exam     General: No acute distress, awake and oriented x3  Psychiatric: good judgment and insight, normal mood  Neurological: cranial nerves II through XII intact, no deficits    Result Review :                   Assessment and Plan   Diagnoses and all orders for this visit:    1. Female infertility (Primary)  -     Prenatal Vit-Fe Fumarate-FA (Prenatal " Vitamin) 27-0.8 MG tablet; Take 1 tablet by mouth Daily.  Dispense: 30 tablet; Refill: 11  -     [place] hysterosalpingogram; Future    Patient with history of gonorrhea and PID.  Suspect tubal factor.  Patient with regularly timed) couple menses, which likely means she is ovulatory.  Have discussed menstrual diary and home ovulation predictor kits.  Partner has never had any children or semen analysis, some male factor could also be at play.  Also recommend semen analysis.  Recommend patient start daily prenatal vitamin.  Discussed the process for obtaining both HSG and semen analysis.  I would encourage her to have her partner do the semen analysis prior to the next visit.  Partner is 30 and generally healthy.  Return to the office roughly 1 week after hysterosalpingogram and semen analysis to review the results.    I spent 20 minutes today on the care of this patient, including review of the chart and any pertinent prior imaging and labs, interview/exam of the patient, developing care plan, and counseling the patient on management options and excluding any time spent on other separate billable services such as performing procedures or test interpretation, if applicable.           Follow Up   Return for 1 week after HSG (either in person or telehealth).  Patient was given instructions and counseling regarding her condition or for health maintenance advice. Please see specific information pulled into the AVS if appropriate.

## 2023-05-08 ENCOUNTER — HOSPITAL ENCOUNTER (OUTPATIENT)
Dept: GENERAL RADIOLOGY | Facility: HOSPITAL | Age: 23
Discharge: HOME | End: 2023-05-08
Admitting: OBSTETRICS & GYNECOLOGY
Payer: MEDICAID

## 2023-05-08 DIAGNOSIS — N97.9 FEMALE INFERTILITY: ICD-10-CM

## 2023-05-08 PROCEDURE — 25510000001 IOPAMIDOL 61 % SOLUTION: Performed by: OBSTETRICS & GYNECOLOGY

## 2023-05-08 PROCEDURE — 74740 X-RAY FEMALE GENITAL TRACT: CPT

## 2023-05-15 PROBLEM — N97.9 FEMALE INFERTILITY: Status: ACTIVE | Noted: 2023-05-15

## 2023-05-16 ENCOUNTER — TELEMEDICINE (OUTPATIENT)
Dept: OBSTETRICS AND GYNECOLOGY | Facility: CLINIC | Age: 23
End: 2023-05-16
Payer: MEDICAID

## 2023-05-16 DIAGNOSIS — N97.9 FEMALE INFERTILITY: Primary | ICD-10-CM

## 2023-05-16 NOTE — PROGRESS NOTES
"Chief Complaint  Follow-up of infertility work-up    Subjective         Obie Jensen presents to [facility]  History of Present Illness   Patient is here for follow-up of infertility work-up.  She had hysterosalpingogram performed recently.  See results below.  She reports that she has had some difficulty with her partner getting set scheduled for semen analysis.  She is still trying to work on him getting scheduled.    Objective   Vital Signs:   There were no vitals taken for this visit.    Estimated body mass index is 25.09 kg/m² as calculated from the following:    Height as of 3/31/23: 165.1 cm (65\").    Weight as of 3/31/23: 68.4 kg (150 lb 12.8 oz).     Virtual Visit Physical Exam   General: No acute distress, awake and oriented x3  Psychiatric: good judgment and insight, normal mood  Neurological: cranial nerves II through XII intact, no deficits    Result Review :                HSG:  FINDINGS: The endometrial cavity is normal in shape, no intraluminal  filling defects or irregularity. Both fallopian tubes are normal in  caliber. There is prompt free spill out both fimbriated ends.     CONCLUSION: Normal hysterosalpingogram.     Assessment and Plan    Diagnoses and all orders for this visit:    1. Female infertility (Primary)      Patient with normal hysterosalpingogram.  Does not appear to be a tubal factor.  Stressed the importance of her partner doing the semen analysis.  She said that the office for semen analysis did not have a copy of the referral.  We have faxed the referral there previously with confirmation.  Recommended that the patient could come by the office to  the referral as well, so that she can take it directly to the office and performed a semen analysis, or she could fax or email it there.  She verbalized understanding.  Explained that she may continue trying to conceive in the interim.  I would next want to see her back after her partner " has completed a semen analysis for further planning.    I spent 20 minutes today on the care of this patient, including review of the chart and any pertinent prior imaging and labs, interview/exam of the patient, developing care plan, and counseling the patient on management options and excluding any time spent on other separate billable services such as performing procedures or test interpretation, if applicable.        Follow Up   No follow-ups on file.  Patient was given instructions and counseling regarding her condition or for health maintenance advice. Please see specific information pulled into the AVS if appropriate.     Mode of Visit: Video  Location of patient: home  Location of provider: [facility]  You have chosen to receive care through a telehealth visit.  The patient has signed the video visit consent form.  The visit included audio and video interaction. No technical issues occurred during this visit.

## 2023-11-20 ENCOUNTER — INITIAL PRENATAL (OUTPATIENT)
Dept: OBSTETRICS AND GYNECOLOGY | Facility: CLINIC | Age: 23
End: 2023-11-20
Payer: MEDICAID

## 2023-11-20 VITALS — SYSTOLIC BLOOD PRESSURE: 122 MMHG | BODY MASS INDEX: 26.26 KG/M2 | WEIGHT: 157.8 LBS | DIASTOLIC BLOOD PRESSURE: 77 MMHG

## 2023-11-20 DIAGNOSIS — Z34.00 SUPERVISION OF NORMAL FIRST PREGNANCY, ANTEPARTUM: ICD-10-CM

## 2023-11-20 DIAGNOSIS — O21.9 NAUSEA AND VOMITING DURING PREGNANCY: ICD-10-CM

## 2023-11-20 DIAGNOSIS — Z11.3 SCREEN FOR SEXUALLY TRANSMITTED DISEASES: ICD-10-CM

## 2023-11-20 DIAGNOSIS — N91.2 ABSENT MENSES: Primary | ICD-10-CM

## 2023-11-20 NOTE — PROGRESS NOTES
"Chief Complaint  Initial Prenatal Visit (Patient is here today for an initial prenatal visit. LMP 09/21/2023, 8w4d ARIADNA 06/24/2024. C/O morning sickness. Last pap was this year- neg)    Carlos Alberto Jensen presents to [facility]  History of Present Illness  Patient is here for initial prenatal visit.  She reports having some nausea, but no episodes of emesis.  She says she feels hungry all the time.  She is otherwise doing well.  She denies bleeding, pelvic pain or cramping.    Patient has been taking Dupixent for psoriasis.  She stopped taking it when she learned that she was pregnant.  She reports that she only has cutaneous psoriasis and does not have any other systemic symptoms.    Objective   Vital Signs:  /77   Wt 71.6 kg (157 lb 12.8 oz)   BMI 26.26 kg/m²   Estimated body mass index is 26.26 kg/m² as calculated from the following:    Height as of 3/31/23: 165.1 cm (65\").    Weight as of this encounter: 71.6 kg (157 lb 12.8 oz).             Physical Exam   General: No acute distress, awake and oriented x3  Psychiatric: good judgment and insight, normal mood  Neurological: cranial nerves II through XII intact, no deficits    Result Review :          US today:  Live single intrauterine pregnancy is identified measuring 9 weeks and 0 days today, consistent with dates.  Normal-appearing ovaries.    No visits with results within 1 Day(s) from this visit.   Latest known visit with results is:   Office Visit on 01/24/2023   Component Date Value Ref Range Status    Diagnosis 01/24/2023 Comment   Final    Comment: NEGATIVE FOR INTRAEPITHELIAL LESION OR MALIGNANCY.  SHIFT IN JESSE SUGGESTIVE OF BACTERIAL VAGINOSIS.      Specimen adequacy: 01/24/2023 Comment   Final    Comment: Satisfactory for evaluation.  Endocervical and/or squamous metaplastic  cells (endocervical component) are present.      Clinician Provided ICD-10: 01/24/2023 Comment   Final    Comment: Z12.4  Z11.3      " Performed by: 01/24/2023 Comment   Final    Elizabeth Mcmahon, Cytotechnologist (ASCP)    . 01/24/2023 .   Final    Note: 01/24/2023 Comment   Final    Comment: The Pap smear is a screening test designed to aid in the detection of  premalignant and malignant conditions of the uterine cervix.  It is not a  diagnostic procedure and should not be used as the sole means of detecting  cervical cancer.  Both false-positive and false-negative reports do occur.      Method: 01/24/2023 Comment   Final    Comment: This liquid based ThinPrep(R) pap test was screened with the  use of an image guided system.      Conv .conv 01/24/2023 Comment   Final    Comment: The HPV DNA reflex criteria were not met with this specimen result  therefore, no HPV testing was performed.      Chlamydia, Nuc. Acid Amp 01/24/2023 Negative  Negative Final    Gonococcus by Nucleic Acid Amp 01/24/2023 Negative  Negative Final    Trichomonas vaginosis 01/24/2023 Negative  Negative Final    HIV Screen 4th Gen w/RFX (Referenc* 01/24/2023 Non Reactive  Non Reactive Final    Comment: HIV Negative  HIV-1/HIV-2 antibodies and HIV-1 p24 antigen were NOT detected.  There is no laboratory evidence of HIV infection.      Hepatitis B Surface Ag 01/24/2023 Negative  Negative Final    RPR 01/24/2023 Comment  Non-Reactive Final    Non-Reactive    Hep C Virus Ab 01/24/2023 <0.1  0.0 - 0.9 s/co ratio Final    Comment:                                   Negative:     < 0.8                               Indeterminate: 0.8 - 0.9                                    Positive:     > 0.9   HCV antibody alone does not differentiate between   previous resolved infection and active infection.   The CDC and current clinical guidelines recommend   that a positive HCV antibody result be followed up   with an HCV RNA test to support the diagnosis of   acute HCV infection. [facility] offers Hepatitis C   Virus (HCV) RNA, Diagnosis, RAGHAV (094392) and   Hepatitis C Virus (HCV) Antibody with  reflex to   Quantitative Real-time PCR ([id number]).                Assessment and Plan   Diagnoses and all orders for this visit:    1. Absent menses (Primary)  -     POC Pregnancy, Urine    2. Supervision of normal first pregnancy, antepartum    Initial prenatal counseling performed today. Educational handouts on prenatal care provided to patient. Discussed frequency of visits, lab testing, OTC medications, physical activity, exercise, dietary restrictions, potential exposures (COVID, Zika, Toxo, etc). Hospital and call coverage system discussed. Pt is recommended to start PNV.     Pap smear is up-to-date.  We will perform routine prenatal labs at her next visit at the time of NIPT testing.  Patient is interested in pursuing NIPT/cell free DNA test.  This can be done at the next visit.    We discussed the use of Dupixent for psoriasis.  Explained that there is really a dearth of information on the use of this medication in pregnancy.  Explained that cannot give any reasonable recommendations due to the lack of information in pregnancy.  As such, I would recommend avoiding the use of this medication as she only uses it to control cutaneous symptoms and does not have any systemic symptoms of psoriasis.  May resume after pregnancy.  She agrees to the plan.    3. Nausea and vomiting during pregnancy  -     doxylamine-pyridoxine ER (BONJESTA) 20-20 MG tablet controlled-release tablet; Take 1 tablet by mouth Every 12 (Twelve) Hours.  Dispense: 60 tablet; Refill: 2    Discussed nausea and vomiting in pregnancy. Discussed diet and lifestyle modifications to help prevent nausea. Discussed use of vitamin B6 and doxylamine up to three times a day as first line pharmacologic therapy. Will send prescription to pharmacy. Weight gain expectations discussed with pt.           Follow Up   Return Ob FU 3 and 7 wks.  Patient was given instructions and counseling regarding her condition or for health maintenance advice. Please see  specific information pulled into the AVS if appropriate.

## 2023-11-21 ENCOUNTER — PATIENT MESSAGE (OUTPATIENT)
Dept: OBSTETRICS AND GYNECOLOGY | Facility: CLINIC | Age: 23
End: 2023-11-21
Payer: MEDICAID

## 2023-11-21 LAB
AMPHETAMINES UR QL SCN: NEGATIVE NG/ML
BARBITURATES UR QL SCN: NEGATIVE NG/ML
BENZODIAZ UR QL SCN: NEGATIVE NG/ML
BZE UR QL SCN: NEGATIVE NG/ML
CANNABINOIDS UR QL SCN: POSITIVE NG/ML
CREAT UR-MCNC: 133.8 MG/DL (ref 20–300)
LABORATORY COMMENT REPORT: ABNORMAL
METHADONE UR QL SCN: NEGATIVE NG/ML
OPIATES UR QL SCN: NEGATIVE NG/ML
OXYCODONE+OXYMORPHONE UR QL SCN: NEGATIVE NG/ML
PCP UR QL: NEGATIVE NG/ML
PH UR: 6.3 [PH] (ref 4.5–8.9)
PROPOXYPH UR QL SCN: NEGATIVE NG/ML

## 2023-11-22 LAB
BACTERIA UR CULT: NO GROWTH
BACTERIA UR CULT: NORMAL
C TRACH RRNA SPEC QL NAA+PROBE: NEGATIVE
N GONORRHOEA RRNA SPEC QL NAA+PROBE: NEGATIVE
T VAGINALIS RRNA SPEC QL NAA+PROBE: NEGATIVE

## 2023-12-12 ENCOUNTER — ROUTINE PRENATAL (OUTPATIENT)
Dept: OBSTETRICS AND GYNECOLOGY | Facility: CLINIC | Age: 23
End: 2023-12-12
Payer: MEDICAID

## 2023-12-12 VITALS — SYSTOLIC BLOOD PRESSURE: 127 MMHG | DIASTOLIC BLOOD PRESSURE: 78 MMHG | BODY MASS INDEX: 27.19 KG/M2 | WEIGHT: 163.4 LBS

## 2023-12-12 DIAGNOSIS — Z13.71 SCREENING FOR GENETIC DISEASE CARRIER STATUS: ICD-10-CM

## 2023-12-12 DIAGNOSIS — Z36.0 ENCOUNTER FOR ANTENATAL SCREENING FOR CHROMOSOMAL ANOMALIES: ICD-10-CM

## 2023-12-12 DIAGNOSIS — Z34.00 SUPERVISION OF NORMAL FIRST PREGNANCY, ANTEPARTUM: ICD-10-CM

## 2023-12-12 DIAGNOSIS — Z3A.11 11 WEEKS GESTATION OF PREGNANCY: Primary | ICD-10-CM

## 2023-12-12 DIAGNOSIS — Z11.3 SCREEN FOR SEXUALLY TRANSMITTED DISEASES: ICD-10-CM

## 2023-12-12 LAB
GLUCOSE UR STRIP-MCNC: NEGATIVE MG/DL
PROT UR STRIP-MCNC: NEGATIVE MG/DL

## 2023-12-13 LAB
ABO GROUP BLD: NORMAL
BASOPHILS # BLD AUTO: 0.1 X10E3/UL (ref 0–0.2)
BASOPHILS NFR BLD AUTO: 1 %
BLD GP AB SCN SERPL QL: NEGATIVE
EOSINOPHIL # BLD AUTO: 0.1 X10E3/UL (ref 0–0.4)
EOSINOPHIL NFR BLD AUTO: 1 %
ERYTHROCYTE [DISTWIDTH] IN BLOOD BY AUTOMATED COUNT: 12.7 % (ref 11.7–15.4)
HBV SURFACE AG SERPL QL IA: NEGATIVE
HCT VFR BLD AUTO: 37 % (ref 34–46.6)
HCV IGG SERPL QL IA: NON REACTIVE
HGB A MFR BLD ELPH: 97.2 % (ref 96.4–98.8)
HGB A2 MFR BLD ELPH: 2.8 % (ref 1.8–3.2)
HGB BLD-MCNC: 12.5 G/DL (ref 11.1–15.9)
HGB F MFR BLD ELPH: 0 % (ref 0–2)
HGB FRACT BLD-IMP: NORMAL
HGB S MFR BLD ELPH: 0 %
HIV 1+2 AB+HIV1 P24 AG SERPL QL IA: NON REACTIVE
IMM GRANULOCYTES # BLD AUTO: 0.1 X10E3/UL (ref 0–0.1)
IMM GRANULOCYTES NFR BLD AUTO: 1 %
LYMPHOCYTES # BLD AUTO: 1.7 X10E3/UL (ref 0.7–3.1)
LYMPHOCYTES NFR BLD AUTO: 20 %
MCH RBC QN AUTO: 30.9 PG (ref 26.6–33)
MCHC RBC AUTO-ENTMCNC: 33.8 G/DL (ref 31.5–35.7)
MCV RBC AUTO: 91 FL (ref 79–97)
MONOCYTES # BLD AUTO: 0.6 X10E3/UL (ref 0.1–0.9)
MONOCYTES NFR BLD AUTO: 7 %
NEUTROPHILS # BLD AUTO: 5.8 X10E3/UL (ref 1.4–7)
NEUTROPHILS NFR BLD AUTO: 70 %
PLATELET # BLD AUTO: 340 X10E3/UL (ref 150–450)
RBC # BLD AUTO: 4.05 X10E6/UL (ref 3.77–5.28)
RH BLD: POSITIVE
RPR SER QL: NON REACTIVE
RUBV IGG SERPL IA-ACNC: 1.32 INDEX
WBC # BLD AUTO: 8.3 X10E3/UL (ref 3.4–10.8)

## 2023-12-15 PROBLEM — Z34.00 SUPERVISION OF NORMAL FIRST PREGNANCY, ANTEPARTUM: Status: ACTIVE | Noted: 2023-12-15

## 2023-12-15 NOTE — PROGRESS NOTES
Chief complaint: Patient here for routine OB visit.    History of present illness: No major complaints at this time.  She denies contractions, vaginal bleeding, leakage of fluid.  She reports active fetal movement.    Objective: See vital signs in the flowsheet  General: No acute distress, awake and oriented x3  Abdomen: Soft, nontender, gravid, fetal heart tones 160 bpm  Extremities: No lower extremity edema, no calf tenderness  Psychiatric: Good judgment insight, normal affect and mood  Neurologic: Cranial nerves II through XII intact, no gross deficits    Assessment:  1.  23-year-old  1 at 12-1/7 weeks gestational age    Plan:  1.  Initial prenatal blood work today.  2.  Discussed options for screening for aneuploidy.  Limitations of testing explained.  Patient opts for NIPT.  This will be done today as well.  3.  Return to the office in 4 weeks.  4.  Prescription for prenatal vitamins sent to the pharmacy

## 2023-12-21 LAB
CITATION REF LAB TEST: NORMAL
GENDER IDENTITY: NORMAL
GENE DIS ANL CARRIER INTERP-IMP: NORMAL
GENE STUDIED ID: NORMAL
GENETIC SCN SPEC: NORMAL
LAB DIRECTOR NAME PROVIDER: NORMAL
Lab: NORMAL
REASON FOR REFERRAL (NARRATIVE): NORMAL
RECOMMENDATION PATIENT DOC-IMP: NORMAL
REF LAB TEST METHOD: NORMAL
SERVICE CMNT-IMP: NORMAL
SPECIMEN SOURCE: NORMAL

## 2024-01-09 ENCOUNTER — ROUTINE PRENATAL (OUTPATIENT)
Dept: OBSTETRICS AND GYNECOLOGY | Facility: CLINIC | Age: 24
End: 2024-01-09
Payer: MEDICAID

## 2024-01-09 VITALS — SYSTOLIC BLOOD PRESSURE: 127 MMHG | BODY MASS INDEX: 28.29 KG/M2 | DIASTOLIC BLOOD PRESSURE: 78 MMHG | WEIGHT: 170 LBS

## 2024-01-09 DIAGNOSIS — F41.9 ANXIETY DURING PREGNANCY: ICD-10-CM

## 2024-01-09 DIAGNOSIS — O99.340 ANXIETY DURING PREGNANCY: ICD-10-CM

## 2024-01-09 DIAGNOSIS — Z34.00 SUPERVISION OF NORMAL FIRST PREGNANCY, ANTEPARTUM: Primary | ICD-10-CM

## 2024-01-09 RX ORDER — HYDROXYZINE HYDROCHLORIDE 25 MG/1
25 TABLET, FILM COATED ORAL 2 TIMES DAILY PRN
Qty: 30 TABLET | Refills: 1 | Status: SHIPPED | OUTPATIENT
Start: 2024-01-09

## 2024-01-10 NOTE — PROGRESS NOTES
Chief complaint: Patient here for routine OB visit.    History of present illness: No major complaints at this time.  She denies contractions, vaginal bleeding, leakage of fluid.      Pt reports anxiety and feeling upset as her dog is having end of life issues. Pt requests medication for this. She has no prior h/o depression or anxiety. Denies any thoughts of self harm or wanting to harm others    Objective: See vital signs in the flowsheet  General: No acute distress, awake and oriented x3  Abdomen: Soft, nontender, gravid, fetal heart tones 155  Extremities: No lower extremity edema, no calf tenderness  Psychiatric: Good judgment insight, normal affect and mood  Neurologic: Cranial nerves II through XII intact, no gross deficits    Labs:  NIPT 46 XY    Assessment:  1. 22 yo G1@ 15 5/7 weeks gestation  2. Anxiety due to social situation    Plan:  1.  Pt with appropriate grief over the pending loss of her pet. Reassurance offered. Discussed use of meds such as SSRI and anxiolytics. Would not recommend benzos due to fetal risk and risk of addiction. Discussed use of hydroxyzine to help with anxiety prn. Advised not to drive, go to work, etc after taking meds. Pt does not feel SSRIs needed at this time. Condolences offered.  2. Otherwise well. Return as scheduled. US next avail for anatomy  3. NIPT results discussed. Limitation of testing explained.

## 2024-02-02 ENCOUNTER — ROUTINE PRENATAL (OUTPATIENT)
Dept: OBSTETRICS AND GYNECOLOGY | Facility: CLINIC | Age: 24
End: 2024-02-02
Payer: MEDICAID

## 2024-02-02 VITALS — DIASTOLIC BLOOD PRESSURE: 72 MMHG | WEIGHT: 176 LBS | BODY MASS INDEX: 29.29 KG/M2 | SYSTOLIC BLOOD PRESSURE: 124 MMHG

## 2024-02-02 DIAGNOSIS — Z34.00 SUPERVISION OF NORMAL FIRST PREGNANCY, ANTEPARTUM: ICD-10-CM

## 2024-02-02 DIAGNOSIS — S39.012A LOW BACK STRAIN, INITIAL ENCOUNTER: Primary | ICD-10-CM

## 2024-02-02 DIAGNOSIS — Z3A.19 19 WEEKS GESTATION OF PREGNANCY: ICD-10-CM

## 2024-02-02 RX ORDER — IBUPROFEN 600 MG/1
600 TABLET ORAL EVERY 6 HOURS
Qty: 8 TABLET | Refills: 0 | Status: SHIPPED | OUTPATIENT
Start: 2024-02-02 | End: 2024-02-04

## 2024-02-02 NOTE — LETTER
February 2, 2024     Patient: Obie Jensen   YOB: 2000   Date of Visit: 2/2/2024       To Whom It May Concern:    Patient has suffered a low back strain.  She should avoid heavy lifting, bending over, pushing and pulling, or strenuous activity for 1 week.  She may work but should have light duty.  It is my medical opinion that Obie Jensen will likely be able to return to normal duty in 1 week.  She has a follow-up appointment with us again on 2/6/2024.           Sincerely,        Yousif Guerrero MD    CC:   No Recipients

## 2024-02-02 NOTE — PROGRESS NOTES
Chief complaint: Back pain  History of present was: Patient is here for an unscheduled visit because of back pain.  She says this began about 3 to 4 days ago.  She says it has been severe at times.  The pain is mostly located on the lower aspect of her back on both sides.  Pain radiates up her back up towards her neck and radiates down her legs bilaterally.  Patient does work in a warehouse; however, she does not recall any type of episode that led to the pain.  She has tried taking Tylenol at home which has given some relief temporarily.  She is also been using heating pads and IcyHot.  She denies dysuria or hematuria.  She denies constipation or diarrhea.  She denies fevers, chills, nausea, vomiting.    O:  Vitals:    24 0936   BP: 124/72   Weight: 79.8 kg (176 lb)     General: No acute distress, awake and oriented x 3  Abdomen: Soft, nontender, gravid, fetal heart tones 155, fetal movement noted on the Doppler  No CVA tenderness  Spinal exam: There is no point tenderness or step-offs.  There is some mild paraspinous tenderness bilaterally in the lower aspect of the back in the sacral region.  Psychiatric: Good judgment insight, normal affect and mood  Neurologic: Cranial nerves II through XII intact, no gross deficits, normal strength and sensation all 4 extremities, 2+ reflexes bilaterally    Assessment:  1.  23-year-old  1 at 19-1/7 weeks gestational age with suspected low back strain    Plan:  1.  I suspect this pain is musculoskeletal in nature.  The description of the symptoms as well as the physical exam support diagnosis of a low back strain.  Symptoms only began a few days ago.  I would recommend avoiding further straining such as heavy lifting, bending over, pushing pulling, etc.  We provided a note for her work that she should be on light duty for the next week.  As the patient is only 19 weeks, a short course of NSAIDs would not be a problem.  I would recommend a 48-hour course of  ibuprofen to help with the discomfort.  She may also continue with the use of alternating heat and ice.  I do not feel that the patient's symptoms are enigmatic of any type of infectious process.  Findings are not consistent with UTI/pyelonephritis.  She is afebrile with no dysuria.  There is no fundal tenderness.  Reassurance offered.  2.  She has a routine appointment scheduled with me in 4 days.  Advised her to keep this appointment for general pregnancy follow-up as well as follow-up with his back pain.

## 2024-02-06 ENCOUNTER — ROUTINE PRENATAL (OUTPATIENT)
Dept: OBSTETRICS AND GYNECOLOGY | Facility: CLINIC | Age: 24
End: 2024-02-06
Payer: MEDICAID

## 2024-02-06 VITALS — BODY MASS INDEX: 29.29 KG/M2 | SYSTOLIC BLOOD PRESSURE: 134 MMHG | DIASTOLIC BLOOD PRESSURE: 74 MMHG | WEIGHT: 176 LBS

## 2024-02-06 DIAGNOSIS — O44.40 LOW LYING PLACENTA NOS OR WITHOUT HEMORRHAGE, UNSPECIFIED TRIMESTER: ICD-10-CM

## 2024-02-06 DIAGNOSIS — Z34.00 SUPERVISION OF NORMAL FIRST PREGNANCY, ANTEPARTUM: ICD-10-CM

## 2024-02-06 DIAGNOSIS — Z3A.19 19 WEEKS GESTATION OF PREGNANCY: Primary | ICD-10-CM

## 2024-02-06 LAB
CLARITY, POC: CLEAR
COLOR UR: YELLOW
GLUCOSE UR STRIP-MCNC: NEGATIVE MG/DL
LEUKOCYTE EST, POC: NEGATIVE
NITRITE UR-MCNC: NEGATIVE MG/ML
PROT UR STRIP-MCNC: NEGATIVE MG/DL

## 2024-02-06 NOTE — PROGRESS NOTES
Chief complaint: Patient here for routine OB visit.    History of present illness:   Patient is here for her routine OB appointment.  She was seen last week because of concerns of back pain.  We started her on 48 hours of ibuprofen, and she states her back pain is completely resolved now.  She is doing much better today.  She is otherwise without major complaints at this time.  She denies contractions, vaginal bleeding, leakage of fluid.  She reports active fetal movement.    Objective: See vital signs in the flowsheet  General: No acute distress, awake and oriented x3  Abdomen: Soft, nontender, gravid, fetal heart tones 150, fundal height 20 cm  Extremities: No lower extremity edema, no calf tenderness  Psychiatric: Good judgment insight, normal affect and mood  Neurologic: Cranial nerves II through XII intact, no gross deficits    Ultrasound today:   Appropriate growth for gestational age  Normal anatomic survey.  Anatomic survey complete  Normal cervical length  Normal amniotic fluid.  Low-lying anterior placenta without evidence of previa.    Assessment:  1.  24-year-old  1 at 19-5/7 weeks gestational age  2.  Anterior low-lying placenta    Plan:  1.  Ultrasound findings were discussed with the patient.  Limitations of the alternatives were explained.  Discussed significance of low-lying placenta.  Currently at 0.8 cm from the internal os even if there was no change recommendations are for a trial of labor.  We will plan to repeat in about 2 months.  2.  Return to the office in 4 weeks and 8 weeks.  Ultrasound and 28-week labs in 8 weeks.

## 2024-02-15 ENCOUNTER — HOSPITAL ENCOUNTER (EMERGENCY)
Facility: HOSPITAL | Age: 24
Discharge: HOME | End: 2024-02-15
Attending: OBSTETRICS & GYNECOLOGY | Admitting: OBSTETRICS & GYNECOLOGY
Payer: MEDICAID

## 2024-02-15 ENCOUNTER — TELEPHONE (OUTPATIENT)
Dept: OBSTETRICS AND GYNECOLOGY | Facility: CLINIC | Age: 24
End: 2024-02-15
Payer: MEDICAID

## 2024-02-15 VITALS
WEIGHT: 178 LBS | DIASTOLIC BLOOD PRESSURE: 68 MMHG | OXYGEN SATURATION: 97 % | BODY MASS INDEX: 29.66 KG/M2 | TEMPERATURE: 97.9 F | HEIGHT: 65 IN | HEART RATE: 77 BPM | RESPIRATION RATE: 18 BRPM | SYSTOLIC BLOOD PRESSURE: 120 MMHG

## 2024-02-15 LAB
BACTERIA UR QL AUTO: ABNORMAL /HPF
BILIRUB UR QL STRIP: NEGATIVE
CLARITY UR: CLEAR
COLOR UR: YELLOW
GLUCOSE UR STRIP-MCNC: NEGATIVE MG/DL
HGB UR QL STRIP.AUTO: NEGATIVE
HYALINE CASTS UR QL AUTO: ABNORMAL /LPF
KETONES UR QL STRIP: NEGATIVE
LEUKOCYTE ESTERASE UR QL STRIP.AUTO: ABNORMAL
NITRITE UR QL STRIP: NEGATIVE
PH UR STRIP.AUTO: 8 [PH] (ref 5–8)
PROT UR QL STRIP: NEGATIVE
RBC # UR STRIP: ABNORMAL /HPF
REF LAB TEST METHOD: ABNORMAL
SP GR UR STRIP: 1.01 (ref 1–1.03)
SQUAMOUS #/AREA URNS HPF: ABNORMAL /HPF
UROBILINOGEN UR QL STRIP: ABNORMAL
WBC # UR STRIP: ABNORMAL /HPF

## 2024-02-15 PROCEDURE — 81001 URINALYSIS AUTO W/SCOPE: CPT | Performed by: OBSTETRICS & GYNECOLOGY

## 2024-02-15 PROCEDURE — 99282 EMERGENCY DEPT VISIT SF MDM: CPT | Performed by: OBSTETRICS & GYNECOLOGY

## 2024-02-15 NOTE — OBED NOTES
"[facility]  Obie Jensen  : 2000  MRN: [id number]  CSN: [id number]    OB ED Provider Note    Subjective   Chief Complaint   Patient presents with    Abdominal Pain     PRATIBHA- Patient states she woke up at 9am with lower right abdominal pain. States when she lays down it does not hurt but when she stands up it is a constant sharp pain. -LF, -VB.      Obie Jensen is a 24 y.o. year old  with an Estimated Date of Delivery: 24 currently at 21w0d presenting with intermittent, fleeting RLQ pain since last night that improves with supine positioning. She denies ROM or VB. FM is not noted.    Prenatal care has been with Dr. Guerrero.  It has been benign.    OB History    Para Term  AB Living   1 0 0 0 0 0   SAB IAB Ectopic Molar Multiple Live Births   0 0 0 0 0 0      # Outcome Date GA Lbr Sea/2nd Weight Sex Delivery Anes PTL Lv   1 Current              Past Medical History:   Diagnosis Date    Eczema     Gonorrhea     PID (pelvic inflammatory disease)     Psoriasis     Weight loss, unintentional      Past Surgical History:   Procedure Laterality Date    FOOT SURGERY Bilateral     HAND SURGERY       No current facility-administered medications for this encounter.    Allergies   Allergen Reactions    Penicillins Unknown (See Comments) and Other (See Comments)     Mother has hx of pcn allergy, patient has never had pcn  Mother has hx of pcn allergy, patient has never had pcn  Other reaction(s): Unknown (See Comments)  Mother has hx of pcn allergy, patient has never had pcn    Latex Rash     Social History    Tobacco Use      Smoking status: Former        Types: Cigars      Smokeless tobacco: Never    Review of Systems   Gastrointestinal:  Positive for abdominal pain.   All other systems reviewed and are negative.        Objective   /68 (BP Location: Right arm, Patient Position: Lying)   Pulse 77   Temp 97.9 °F (36.6 °C) (Oral)   Resp 18   Ht 165.1 cm (65\")   Wt 80.7 kg (178 lb) "   LMP 09/21/2023 (Exact Date)   SpO2 97%   BMI 29.62 kg/m²   General: well developed; well nourished  no acute distress   Abdomen: soft, point tenderness RLQ in region of round ligament pain; no masses  gravid    FHT's: 152      Cervix: was not checked.   Presentation: Unable to appreciate   Contractions: Not monitored   Chest: Unlabored respirations    CV:  RRR   Ext:   No C/C/E   Back: CVA tenderness is deferred bilateral        Prenatal Labs  Lab Results   Component Value Date    HGB 12.5 12/12/2023    RUBELLAABIGG 1.32 12/12/2023    HEPBSAG Negative 12/12/2023    ABORH No Prev Hx 06/13/2022    ABORH A POS 06/13/2022    ABSCRN Negative 12/12/2023    OBC5MCS4 Non Reactive 12/12/2023    HEPCVIRUSABY Non Reactive 12/12/2023    URINECX Final report 11/20/2023    CHLAMNAA Negative 11/20/2023    NGONORRHON Negative 11/20/2023       Current Labs Reviewed   UA:    Lab Results   Component Value Date    SQUAMEPIUA 7-12 (A) 02/15/2024    SPECGRAVUR 1.015 02/15/2024    KETONESU Negative 02/15/2024    BLOODU Negative 02/15/2024    LEUKOCYTESUR Small (1+) (A) 02/15/2024    NITRITEU Negative 02/15/2024    RBCUA None Seen 02/15/2024    WBCUA 0-2 02/15/2024    BACTERIA 1+ (A) 02/15/2024          Assessment   IUP at 21w0d  Round ligament pain- location, alleviating positioning c/w round ligament pain     Plan   D/C home. Keep regularly scheduled prenatal appointments. Return for worsening symptoms, acute changes.     Art Orr MD  2/15/2024  13:05 EST

## 2024-02-15 NOTE — NURSING NOTE
Patient given discharge instructions and verbalized understanding. Patient ambulated off unit with belongings.

## 2024-02-16 ENCOUNTER — TELEPHONE (OUTPATIENT)
Dept: OBSTETRICS AND GYNECOLOGY | Facility: CLINIC | Age: 24
End: 2024-02-16
Payer: MEDICAID

## 2024-02-16 RX ORDER — NITROFURANTOIN 25; 75 MG/1; MG/1
100 CAPSULE ORAL 2 TIMES DAILY
Qty: 14 CAPSULE | Refills: 0 | Status: SHIPPED | OUTPATIENT
Start: 2024-02-16 | End: 2024-02-16

## 2024-03-01 ENCOUNTER — TELEPHONE (OUTPATIENT)
Dept: OBSTETRICS AND GYNECOLOGY | Facility: CLINIC | Age: 24
End: 2024-03-01
Payer: MEDICAID

## 2024-03-01 NOTE — TELEPHONE ENCOUNTER
PATIENT SAID SHE TESTED POSITIVE FOR COVID AND HAS BEEN TAKING TYLENOL COLD & FLU BECAUSE SHE'S BEEN FEELING AWFUL. HAS RUNNY NOSE, EAR PAIN, COUGH, SORE THROAT WHEN COUGHING. PATIENT WANTS TO KNOW IF ITS OKAY TO CONTINUE TAKING THE TYLENOL COLD AND FLU OR WHAT SHE SHOULD DO.

## 2024-03-01 NOTE — TELEPHONE ENCOUNTER
The Tylenol Cold and flu contains Tylenol for pain, cough suppressant, and something for runny nose and congestion.  She can also take lozenges or cough drops.  She should try to stay well-hydrated.    If she has high fevers greater than 101, inability to keep down liquids, difficulty breathing she should go to the hospital for evaluation.      She is currently scheduled for follow-up on 3/5.  She will need to reschedule this to the following week.

## 2024-03-04 ENCOUNTER — TELEPHONE (OUTPATIENT)
Dept: OBSTETRICS AND GYNECOLOGY | Facility: CLINIC | Age: 24
End: 2024-03-04
Payer: MEDICAID

## 2024-03-04 NOTE — TELEPHONE ENCOUNTER
Patient scheduled tomorrow for OB follow up. Needs to push it out a week. Has COVID. May she be worked in next week? Thank You

## 2024-03-06 ENCOUNTER — REFERRAL TRIAGE (OUTPATIENT)
Dept: LABOR AND DELIVERY | Facility: HOSPITAL | Age: 24
End: 2024-03-06
Payer: MEDICAID

## 2024-03-07 ENCOUNTER — PATIENT OUTREACH (OUTPATIENT)
Dept: LABOR AND DELIVERY | Facility: HOSPITAL | Age: 24
End: 2024-03-07
Payer: MEDICAID

## 2024-03-07 NOTE — OUTREACH NOTE
[facility]  Enrollment    Current Estimated Gestational Age: 24w0d    Questions/Answers      Flowsheet Row Responses   Would like to participate? Yes   Date of Intake Visit 03/11/24            Intake for Monday.    Forrest Sinha RN  Maternity Nurse Navigator    3/7/2024, 10:58 EST

## 2024-03-11 ENCOUNTER — PATIENT OUTREACH (OUTPATIENT)
Dept: LABOR AND DELIVERY | Facility: HOSPITAL | Age: 24
End: 2024-03-11
Payer: MEDICAID

## 2024-03-11 NOTE — OUTREACH NOTE
"[facility]  Intake    Current Estimated Gestational Age: 24w4d    Intake Assessment      Flowsheet Row Responses   Best Method for Contacting Cell   Currently Employed Yes   Able to keep appointments as scheduled Yes   Gender(s) and Name(s) m   Do you have a dentist? No   Resources Presently Utilizing: None   Maternal Warning Signs Provided   Other Education HANDS, How to find a dentist, Insurance benefits/Incentives, Mental Health Services, SNAP Benefits, Transportation Assistance, WIC Benefits, Smoking/Vaping Cessation            Learning Assessment      Flowsheet Row Responses   Relationship Patient   Does the learner have any barriers to learning? No Barriers   What is the preferred language of the learner for medical teaching? English   Is an  required? No   How does the learner prefer to learn new concepts? Listening, Reading            Tobacco, Alcohol, and Drug History     reports that she has quit smoking. Her smoking use included cigars. She has never used smokeless tobacco.   reports no history of alcohol use.   reports current drug use. Drug: Marijuana.    Forrest Sinha RN  Maternity Nurse Navigator    3/11/2024, 11:18 EDT  [facility]  Check-In    Current Estimated Gestational Age: 24w4d      Questions/Answers      Flowsheet Row Responses   Best Method for Contacting Cell   Demographics Reviewed Yes   Currently Employed Yes   Able to keep appointments as scheduled Yes   Gender(s) and Name(s) m   Baby Active/Feeling Fetal Movemen Yes   How are you presently feeling? good   Questions regarding prenatal visits or tests to be ordered? No   May I ask you questions about your substance use? Yes   Other Comment THC \"sometimes to help with appetite\" and vape   Supplies ready for baby Clothing, Diapers   Resource/Environmental Concerns None   Do you have any questions related to your care experience, your pregnancy, plans for delivery, any concerns, etc? No   Other Education HANDS, " How to find a dentist, Insurance benefits/Incentives, Mental Health Services, SNAP Benefits, Transportation Assistance, WIC Benefits, Smoking/Vaping Cessation            Pt doing well today, no c/o.  Sent intake packet + smoking cessation sites and dental offices.  Maternal warning s/s reviewed, pt verbalized understanding.  Encouraged to reach out with any needs.  Will follow up again later this month.    Forrest Sinha RN  Maternity Nurse Navigator    3/11/2024, 11:18 EDT

## 2024-03-12 ENCOUNTER — ROUTINE PRENATAL (OUTPATIENT)
Dept: OBSTETRICS AND GYNECOLOGY | Facility: CLINIC | Age: 24
End: 2024-03-12
Payer: MEDICAID

## 2024-03-12 VITALS — WEIGHT: 188 LBS | SYSTOLIC BLOOD PRESSURE: 121 MMHG | DIASTOLIC BLOOD PRESSURE: 76 MMHG | BODY MASS INDEX: 31.28 KG/M2

## 2024-03-12 DIAGNOSIS — O44.40 LOW LYING PLACENTA NOS OR WITHOUT HEMORRHAGE, UNSPECIFIED TRIMESTER: ICD-10-CM

## 2024-03-12 DIAGNOSIS — Z34.00 SUPERVISION OF NORMAL FIRST PREGNANCY, ANTEPARTUM: Primary | ICD-10-CM

## 2024-03-12 PROCEDURE — 99214 OFFICE O/P EST MOD 30 MIN: CPT | Performed by: OBSTETRICS & GYNECOLOGY

## 2024-03-12 RX ORDER — NITROFURANTOIN 25; 75 MG/1; MG/1
CAPSULE ORAL
COMMUNITY
Start: 2024-03-01

## 2024-03-12 RX ORDER — IBUPROFEN 600 MG/1
TABLET ORAL
COMMUNITY
Start: 2024-03-01

## 2024-03-12 NOTE — PROGRESS NOTES
Chief complaint: Patient here for routine OB visit.    History of present illness:   Patient recently had COVID.  She reports about 3 days of having a cough and some ear pain.  She says otherwise she did not have symptoms.  She denies fevers or chills.  She denies nausea, vomiting, diarrhea.  She says she feels back to normal at this time.  No major complaints at this time.  She denies contractions, vaginal bleeding, leakage of fluid.  She reports active fetal movement.    Objective: See vital signs in the flowsheet  General: No acute distress, awake and oriented x3  Abdomen: Soft, nontender, gravid, fetal heart tones 155 bpm, fundal height 24 cm  Extremities: No lower extremity edema, no calf tenderness  Psychiatric: Good judgment insight, normal affect and mood  Neurologic: Cranial nerves II through XII intact, no gross deficits    Assessment:  1.  24-year-old  1 at 24-5/7 weeks gestational age  2.  Low-lying placenta    Plan:  1.  Discussed recent COVID illness.  Reassurance offered to patient.  No concerning findings at this time.  2.  Ultrasound in 4 weeks for growth and reevaluation of placenta.  Order for ultrasound placed at this time.  3.  Plan 28-week labs at next visit.  Labs ordered at this time.

## 2024-03-25 ENCOUNTER — PATIENT OUTREACH (OUTPATIENT)
Dept: LABOR AND DELIVERY | Facility: HOSPITAL | Age: 24
End: 2024-03-25
Payer: MEDICAID

## 2024-03-25 NOTE — OUTREACH NOTE
[facility]  Unable to Reach       Questions/Answers      Flowsheet Row Responses   Pending Outreach Prenatal Check-in   Call Attempt First   Outcome Left message            Left vm, encouraged to reach out with any needs. Will call again at a later date.      Forrest Sinha RN  Maternity Nurse Navigator    3/25/2024, 10:36 EDT

## 2024-04-09 ENCOUNTER — ROUTINE PRENATAL (OUTPATIENT)
Dept: OBSTETRICS AND GYNECOLOGY | Facility: CLINIC | Age: 24
End: 2024-04-09
Payer: MEDICAID

## 2024-04-09 VITALS — SYSTOLIC BLOOD PRESSURE: 126 MMHG | WEIGHT: 196 LBS | BODY MASS INDEX: 32.62 KG/M2 | DIASTOLIC BLOOD PRESSURE: 78 MMHG

## 2024-04-09 DIAGNOSIS — Z23 NEED FOR TDAP VACCINATION: ICD-10-CM

## 2024-04-09 DIAGNOSIS — O26.899 PREGNANCY COMPLICATED BY UMBILICAL CORD VARIX IN ANTEPARTUM PERIOD: ICD-10-CM

## 2024-04-09 DIAGNOSIS — O44.40 LOW LYING PLACENTA NOS OR WITHOUT HEMORRHAGE, UNSPECIFIED TRIMESTER: ICD-10-CM

## 2024-04-09 DIAGNOSIS — Z34.00 SUPERVISION OF NORMAL FIRST PREGNANCY, ANTEPARTUM: Primary | ICD-10-CM

## 2024-04-09 PROCEDURE — 99214 OFFICE O/P EST MOD 30 MIN: CPT | Performed by: OBSTETRICS & GYNECOLOGY

## 2024-04-09 PROCEDURE — 90715 TDAP VACCINE 7 YRS/> IM: CPT | Performed by: OBSTETRICS & GYNECOLOGY

## 2024-04-09 PROCEDURE — 90471 IMMUNIZATION ADMIN: CPT | Performed by: OBSTETRICS & GYNECOLOGY

## 2024-04-09 NOTE — PROGRESS NOTES
Chief complaint: Patient here for routine OB visit.    History of present illness:   Patient reports having occasional pruritic rash underneath her breast on both sides.  She has been using talc powder for this.  She is otherwise without major complaints at this time.  She denies contractions, vaginal bleeding, leakage of fluid.  She reports active fetal movement.    Objective: See vital signs in the flowsheet  General: No acute distress, awake and oriented x3  Abdomen: Soft, nontender, gravid, fetal heart tones 140 bpm  Extremities: No lower extremity edema, no calf tenderness  Psychiatric: Good judgment insight, normal affect and mood  Neurologic: Cranial nerves II through XII intact, no gross deficits    Ultrasound today:   Appropriate growth for gestational age estimated fetal weight at the 52nd percentile and the abdominal circumference at the 21st percentile  Normal amniotic fluid  Anterior placenta with no previa  Breech presentation  Small umbilical cord varix noted    Assessment:  1.  24-year-old  1 at 28-5/7 weeks gestational age  2.  Low-lying placenta resolved  3.  Breech presentation  4.  Need for Tdap  5.  Umbilical vein varix    Plan:  1.  Ultrasound findings were discussed with the patient today.  Resolution of low-lying placenta is noted.  New finding of breech presentation is noted.  Discussed potential significance.  Explained that this often resolves spontaneously.  We will reevaluate in 4 weeks.  Also noted small umbilical vein varix.  In the absence of fetal growth restriction these typically do not convey significant risk to the fetus; however, close monitoring is advised by up-to-date.  We will plan to start weekly  testing at 32 weeks.  2.  28-week labs today.  3. Recommendation is made for the Tdap vaccine for maternal and  benefit.  Risk, benefits, alternatives, and side effects discussed.  Tdap to be administered today.  4.  Turn to the office in 2 weeks.

## 2024-04-10 ENCOUNTER — TELEPHONE (OUTPATIENT)
Dept: OBSTETRICS AND GYNECOLOGY | Facility: CLINIC | Age: 24
End: 2024-04-10

## 2024-04-11 ENCOUNTER — TELEPHONE (OUTPATIENT)
Dept: OBSTETRICS AND GYNECOLOGY | Facility: CLINIC | Age: 24
End: 2024-04-11

## 2024-04-11 DIAGNOSIS — O99.810 ABNORMAL GLUCOSE IN PREGNANCY, ANTEPARTUM: ICD-10-CM

## 2024-04-13 LAB
GLUCOSE 1H P 100 G GLC PO SERPL-MCNC: 136 MG/DL (ref 70–179)
GLUCOSE 2H P 100 G GLC PO SERPL-MCNC: 65 MG/DL (ref 70–154)
GLUCOSE 3H P 100 G GLC PO SERPL-MCNC: 71 MG/DL (ref 70–139)
GLUCOSE P FAST SERPL-MCNC: 81 MG/DL (ref 70–94)
Lab: ABNORMAL

## 2024-04-16 ENCOUNTER — PATIENT OUTREACH (OUTPATIENT)
Dept: LABOR AND DELIVERY | Facility: HOSPITAL | Age: 24
End: 2024-04-16
Payer: MEDICAID

## 2024-04-16 NOTE — OUTREACH NOTE
[facility]  Check-In    Current Estimated Gestational Age: 29w5d      Questions/Answers      Flowsheet Row Responses   Best Method for Contacting Cell   Demographics Reviewed Yes   Currently Employed Yes   Able to keep appointments as scheduled Yes   Gender(s) and Name(s) m   Baby Active/Feeling Fetal Movemen Yes   How are you presently feeling? good   Questions regarding prenatal visits or tests to be ordered? No   Education related to new diagnoses/home equipment No   Supplies ready for baby Clothing, Crib, Diapers, Feeding Supplies   Resource/Environmental Concerns None   Do you have any questions related to your care experience, your pregnancy, plans for delivery, any concerns, etc? No            Pt doing well today, no c/o.  Has most baby supplies, still deciding on car seat.  Pt interested in peds list-sent.  Has WIC now.  Maternal warning s/s reviewed, pt verbalized understanding.  Encouraged to reach out with any needs.  Will follow up again in May.  Sent 28wk and scheduled EPDS.    Forrest Sinha RN  Maternity Nurse Navigator    4/16/2024, 16:04 EDT

## 2024-04-25 ENCOUNTER — ROUTINE PRENATAL (OUTPATIENT)
Dept: OBSTETRICS AND GYNECOLOGY | Facility: CLINIC | Age: 24
End: 2024-04-25
Payer: MEDICAID

## 2024-04-25 VITALS — WEIGHT: 199.4 LBS | SYSTOLIC BLOOD PRESSURE: 124 MMHG | BODY MASS INDEX: 33.18 KG/M2 | DIASTOLIC BLOOD PRESSURE: 79 MMHG

## 2024-04-25 DIAGNOSIS — O26.899 PREGNANCY COMPLICATED BY UMBILICAL CORD VARIX IN ANTEPARTUM PERIOD: Primary | ICD-10-CM

## 2024-04-25 DIAGNOSIS — O99.810 ABNORMAL GLUCOSE IN PREGNANCY, ANTEPARTUM: ICD-10-CM

## 2024-04-25 DIAGNOSIS — O09.93 PREGNANCY, SUPERVISION, HIGH-RISK, THIRD TRIMESTER: ICD-10-CM

## 2024-04-25 RX ORDER — BLOOD-GLUCOSE METER
1 KIT MISCELLANEOUS ONCE
Qty: 1 EACH | Refills: 0 | Status: SHIPPED | OUTPATIENT
Start: 2024-04-25 | End: 2024-04-25

## 2024-04-25 RX ORDER — LANCETS 28 GAUGE
1 EACH MISCELLANEOUS 4 TIMES DAILY
Qty: 100 EACH | Refills: 5 | Status: SHIPPED | OUTPATIENT
Start: 2024-04-25

## 2024-04-30 ENCOUNTER — ROUTINE PRENATAL (OUTPATIENT)
Dept: OBSTETRICS AND GYNECOLOGY | Facility: CLINIC | Age: 24
End: 2024-04-30
Payer: MEDICAID

## 2024-04-30 VITALS — WEIGHT: 200 LBS | SYSTOLIC BLOOD PRESSURE: 130 MMHG | DIASTOLIC BLOOD PRESSURE: 78 MMHG | BODY MASS INDEX: 33.28 KG/M2

## 2024-04-30 DIAGNOSIS — O26.899 PREGNANCY COMPLICATED BY UMBILICAL CORD VARIX IN ANTEPARTUM PERIOD: ICD-10-CM

## 2024-04-30 DIAGNOSIS — O09.93 PREGNANCY, SUPERVISION, HIGH-RISK, THIRD TRIMESTER: Primary | ICD-10-CM

## 2024-04-30 PROBLEM — N92.6 IRREGULAR MENSES: Status: RESOLVED | Noted: 2019-01-08 | Resolved: 2024-04-30

## 2024-04-30 PROBLEM — Z34.00 SUPERVISION OF NORMAL FIRST PREGNANCY, ANTEPARTUM: Status: RESOLVED | Noted: 2023-12-15 | Resolved: 2024-04-30

## 2024-04-30 PROBLEM — O44.40 LOW LYING PLACENTA NOS OR WITHOUT HEMORRHAGE, UNSPECIFIED TRIMESTER: Status: RESOLVED | Noted: 2024-02-06 | Resolved: 2024-04-30

## 2024-04-30 PROCEDURE — 99213 OFFICE O/P EST LOW 20 MIN: CPT | Performed by: OBSTETRICS & GYNECOLOGY

## 2024-04-30 RX ORDER — BLOOD-GLUCOSE METER
KIT MISCELLANEOUS
COMMUNITY
Start: 2024-04-26

## 2024-04-30 NOTE — PROGRESS NOTES
Chief complaint: Patient here for routine OB visit.    History of present illness: No major complaints at this time.  She denies contractions, vaginal bleeding, leakage of fluid.  She reports active fetal movement.    Objective: See vital signs in the flowsheet  General: No acute distress, awake and oriented x3  Abdomen: Soft, nontender, gravid, fetal heart tones 153, fundal height 32 cm  Extremities: No lower extremity edema, no calf tenderness  Psychiatric: Good judgment insight, normal affect and mood  Neurologic: Cranial nerves II through XII intact, no gross deficits    Ultrasound today:   Biophysical profile   Normal amniotic fluid  Breech presentation    Labs:  Home Accu-Cheks:  Fasting are between 88-95 x 3 days  Postprandial: Between  x 3 readings    Assessment:  1.  24-year-old  1 at 31-5/7 weeks gestational age  2.  Breech presentation  3.  Abnormal 1 hour glucose tolerance test, patient could not tolerate 3-hour test  4.  Umbilical cord varix    Plan:  1.  Ultrasound findings from today were discussed with the patient.  Limitations of ultrasound were explained.  Would recommend continued weekly biophysical profile due to umbilical cord varix and repeat growth every 4 weeks.  2.  Patient checking sugars at home, but only has about 3 days of readings.  So far these have all been within range.  Would advise her to continue to check for another week and we will reevaluate next week.      
Speaking Coherently

## 2024-05-07 ENCOUNTER — ROUTINE PRENATAL (OUTPATIENT)
Dept: OBSTETRICS AND GYNECOLOGY | Facility: CLINIC | Age: 24
End: 2024-05-07
Payer: MEDICAID

## 2024-05-07 DIAGNOSIS — O26.899 PREGNANCY COMPLICATED BY UMBILICAL CORD VARIX IN ANTEPARTUM PERIOD: ICD-10-CM

## 2024-05-07 DIAGNOSIS — O09.93 PREGNANCY, SUPERVISION, HIGH-RISK, THIRD TRIMESTER: ICD-10-CM

## 2024-05-07 DIAGNOSIS — O36.5990 ASYMMETRIC IUGR AFFECTING PREGNANCY, ANTEPARTUM: Primary | ICD-10-CM

## 2024-05-08 ENCOUNTER — TRANSCRIBE ORDERS (OUTPATIENT)
Dept: ULTRASOUND IMAGING | Facility: HOSPITAL | Age: 24
End: 2024-05-08
Payer: MEDICAID

## 2024-05-08 DIAGNOSIS — O36.5991 IUGR (INTRAUTERINE GROWTH RESTRICTION) AFFECTING CARE OF MOTHER, UNSPECIFIED TRIMESTER, FETUS 1: ICD-10-CM

## 2024-05-08 DIAGNOSIS — O26.899 PREGNANCY COMPLICATED BY UMBILICAL CORD VARIX IN ANTEPARTUM PERIOD: Primary | ICD-10-CM

## 2024-05-09 VITALS — DIASTOLIC BLOOD PRESSURE: 75 MMHG | WEIGHT: 202 LBS | BODY MASS INDEX: 33.61 KG/M2 | SYSTOLIC BLOOD PRESSURE: 128 MMHG

## 2024-05-09 PROBLEM — O36.5990 ASYMMETRIC IUGR AFFECTING PREGNANCY, ANTEPARTUM: Status: ACTIVE | Noted: 2024-05-09

## 2024-05-14 ENCOUNTER — ROUTINE PRENATAL (OUTPATIENT)
Dept: OBSTETRICS AND GYNECOLOGY | Facility: CLINIC | Age: 24
End: 2024-05-14
Payer: MEDICAID

## 2024-05-14 ENCOUNTER — TELEPHONE (OUTPATIENT)
Dept: OBSTETRICS AND GYNECOLOGY | Facility: CLINIC | Age: 24
End: 2024-05-14

## 2024-05-14 VITALS — SYSTOLIC BLOOD PRESSURE: 131 MMHG | DIASTOLIC BLOOD PRESSURE: 84 MMHG | WEIGHT: 204 LBS | BODY MASS INDEX: 33.95 KG/M2

## 2024-05-14 DIAGNOSIS — O21.9 NAUSEA AND VOMITING DURING PREGNANCY: ICD-10-CM

## 2024-05-14 DIAGNOSIS — Z3A.33 33 WEEKS GESTATION OF PREGNANCY: Primary | ICD-10-CM

## 2024-05-14 DIAGNOSIS — O09.93 PREGNANCY, SUPERVISION, HIGH-RISK, THIRD TRIMESTER: ICD-10-CM

## 2024-05-14 DIAGNOSIS — O36.5990 ASYMMETRIC IUGR AFFECTING PREGNANCY, ANTEPARTUM: ICD-10-CM

## 2024-05-14 LAB
CLARITY, POC: CLEAR
COLOR UR: YELLOW
GLUCOSE UR STRIP-MCNC: NEGATIVE MG/DL
PROT UR STRIP-MCNC: NEGATIVE MG/DL

## 2024-05-14 RX ORDER — ONDANSETRON 4 MG/1
4 TABLET, ORALLY DISINTEGRATING ORAL EVERY 8 HOURS PRN
Qty: 30 TABLET | Refills: 1 | Status: SHIPPED | OUTPATIENT
Start: 2024-05-14

## 2024-05-14 NOTE — TELEPHONE ENCOUNTER
Patient 34 weeks pregnant. Requesting prescription for zofran  be sent to pharmacy please. Pt Ph [phone]

## 2024-05-14 NOTE — PROGRESS NOTES
Chief complaint: Patient here for routine OB visit.    History of present illness:   Patient reports starting to have some nausea again, mostly postprandial.  She is otherwise without major complaints at this time.  She denies contractions, vaginal bleeding, leakage of fluid.  She reports active fetal movement.    Objective: See vital signs in the flowsheet  General: No acute distress, awake and oriented x3  Abdomen: Soft, nontender, gravid, fetal heart tones 129, fundal height 30 cm  Extremities: No lower extremity edema, no calf tenderness  Psychiatric: Good judgment insight, normal affect and mood  Neurologic: Cranial nerves II through XII intact, no gross deficits    Ultrasound today:   Biophysical profile   Normal amniotic fluid  Breech presentation  Normal umbilical artery Dopplers without absent or reversed end-diastolic flow noted    Assessment:  1.  24-year-old  1 at 33-5/7 weeks gestational age  2.  asymmetric fetal growth restriction  3.  Umbilical vein varix  4.  Breech presentation  5.  Nausea and vomiting of pregnancy.    Plan:  1.  Ultrasound findings from today were discussed with the patient.  Significance of today's ultrasound results were discussed.  Recommend continue weekly  testing with biophysical profile and umbilical artery Dopplers due to the asymmetric growth restriction.  Patient has first MFM appointment in 2 weeks.  I will see her back next week.  2.  We discussed dietary and lifestyle modifications to help with nausea and vomiting.  Advise she may try eating more frequent but smaller meals.  Can also send in a prescription for Zofran.  May help to take this before eating.  3.  We discussed persistent breech presentation.  We discussed options of external cephalic version versus primary  delivery if the baby is still breech at the time of labor/delivery.  Patient declines external cephalic version.  She requests primary  section if the baby is found to  still be breech.

## 2024-05-18 ENCOUNTER — APPOINTMENT (OUTPATIENT)
Dept: GENERAL RADIOLOGY | Facility: HOSPITAL | Age: 24
End: 2024-05-18
Payer: MEDICAID

## 2024-05-18 ENCOUNTER — APPOINTMENT (OUTPATIENT)
Dept: CT IMAGING | Facility: HOSPITAL | Age: 24
End: 2024-05-18
Payer: MEDICAID

## 2024-05-18 ENCOUNTER — HOSPITAL ENCOUNTER (EMERGENCY)
Facility: HOSPITAL | Age: 24
Discharge: HOME | End: 2024-05-18
Attending: OBSTETRICS & GYNECOLOGY | Admitting: OBSTETRICS & GYNECOLOGY
Payer: MEDICAID

## 2024-05-18 VITALS
DIASTOLIC BLOOD PRESSURE: 80 MMHG | OXYGEN SATURATION: 96 % | TEMPERATURE: 97 F | HEART RATE: 82 BPM | BODY MASS INDEX: 33.98 KG/M2 | SYSTOLIC BLOOD PRESSURE: 128 MMHG | RESPIRATION RATE: 16 BRPM | HEIGHT: 65 IN | WEIGHT: 203.93 LBS

## 2024-05-18 DIAGNOSIS — R07.9 CHEST PAIN, UNSPECIFIED TYPE: Primary | ICD-10-CM

## 2024-05-18 LAB
ALBUMIN SERPL-MCNC: 3.6 G/DL (ref 3.5–5.2)
ALBUMIN/GLOB SERPL: 1 G/DL
ALP SERPL-CCNC: 229 U/L (ref 39–117)
ALT SERPL W P-5'-P-CCNC: 12 U/L (ref 1–33)
ANION GAP SERPL CALCULATED.3IONS-SCNC: 13.5 MMOL/L (ref 5–15)
AST SERPL-CCNC: 15 U/L (ref 1–32)
BASOPHILS # BLD MANUAL: 0 10*3/MM3 (ref 0–0.2)
BASOPHILS NFR BLD MANUAL: 0 % (ref 0–1.5)
BILIRUB SERPL-MCNC: <0.2 MG/DL (ref 0–1.2)
BILIRUB UR QL STRIP: NEGATIVE
BUN SERPL-MCNC: 5 MG/DL (ref 6–20)
BUN/CREAT SERPL: 11.4 (ref 7–25)
CALCIUM SPEC-SCNC: 9.2 MG/DL (ref 8.6–10.5)
CHLORIDE SERPL-SCNC: 103 MMOL/L (ref 98–107)
CLARITY UR: CLEAR
CO2 SERPL-SCNC: 17.5 MMOL/L (ref 22–29)
COLOR UR: YELLOW
CREAT SERPL-MCNC: 0.44 MG/DL (ref 0.57–1)
DEPRECATED RDW RBC AUTO: 39.7 FL (ref 37–54)
EGFRCR SERPLBLD CKD-EPI 2021: 137 ML/MIN/1.73
EOSINOPHIL # BLD MANUAL: 0.17 10*3/MM3 (ref 0–0.4)
EOSINOPHIL NFR BLD MANUAL: 1 % (ref 0.3–6.2)
ERYTHROCYTE [DISTWIDTH] IN BLOOD BY AUTOMATED COUNT: 11.8 % (ref 12.3–15.4)
GIANT PLATELETS: ABNORMAL
GLOBULIN UR ELPH-MCNC: 3.5 GM/DL
GLUCOSE SERPL-MCNC: 80 MG/DL (ref 65–99)
GLUCOSE UR STRIP-MCNC: NEGATIVE MG/DL
HCT VFR BLD AUTO: 35.3 % (ref 34–46.6)
HGB BLD-MCNC: 11.4 G/DL (ref 12–15.9)
HGB UR QL STRIP.AUTO: NEGATIVE
HOLD SPECIMEN: NORMAL
HOLD SPECIMEN: NORMAL
KETONES UR QL STRIP: NEGATIVE
LEUKOCYTE ESTERASE UR QL STRIP.AUTO: NEGATIVE
LYMPHOCYTES # BLD MANUAL: 3.48 10*3/MM3 (ref 0.7–3.1)
LYMPHOCYTES NFR BLD MANUAL: 3 % (ref 5–12)
MCH RBC QN AUTO: 29.9 PG (ref 26.6–33)
MCHC RBC AUTO-ENTMCNC: 32.3 G/DL (ref 31.5–35.7)
MCV RBC AUTO: 92.7 FL (ref 79–97)
MONOCYTES # BLD: 0.5 10*3/MM3 (ref 0.1–0.9)
MYELOCYTES NFR BLD MANUAL: 5 % (ref 0–0)
NEUTROPHILS # BLD AUTO: 11.59 10*3/MM3 (ref 1.7–7)
NEUTROPHILS NFR BLD MANUAL: 70 % (ref 42.7–76)
NITRITE UR QL STRIP: NEGATIVE
NRBC BLD AUTO-RTO: 0 /100 WBC (ref 0–0.2)
PH UR STRIP.AUTO: 6.5 [PH] (ref 5–8)
PLATELET # BLD AUTO: 294 10*3/MM3 (ref 140–450)
PMV BLD AUTO: 10.5 FL (ref 6–12)
POLYCHROMASIA BLD QL SMEAR: ABNORMAL
POTASSIUM SERPL-SCNC: 4.1 MMOL/L (ref 3.5–5.2)
PROCALCITONIN SERPL-MCNC: 0.04 NG/ML (ref 0–0.25)
PROT SERPL-MCNC: 7.1 G/DL (ref 6–8.5)
PROT UR QL STRIP: NEGATIVE
QT INTERVAL: 352 MS
QTC INTERVAL: 436 MS
RBC # BLD AUTO: 3.81 10*6/MM3 (ref 3.77–5.28)
SODIUM SERPL-SCNC: 134 MMOL/L (ref 136–145)
SP GR UR STRIP: 1.01 (ref 1–1.03)
TROPONIN T SERPL HS-MCNC: <6 NG/L
UROBILINOGEN UR QL STRIP: NORMAL
VARIANT LYMPHS NFR BLD MANUAL: 21 % (ref 19.6–45.3)
WBC MORPH BLD: NORMAL
WBC NRBC COR # BLD AUTO: 16.55 10*3/MM3 (ref 3.4–10.8)
WHOLE BLOOD HOLD COAG: NORMAL
WHOLE BLOOD HOLD SPECIMEN: NORMAL

## 2024-05-18 PROCEDURE — 85025 COMPLETE CBC W/AUTO DIFF WBC: CPT

## 2024-05-18 PROCEDURE — 85007 BL SMEAR W/DIFF WBC COUNT: CPT

## 2024-05-18 PROCEDURE — 80053 COMPREHEN METABOLIC PANEL: CPT

## 2024-05-18 PROCEDURE — 84484 ASSAY OF TROPONIN QUANT: CPT

## 2024-05-18 PROCEDURE — 25810000003 DEXTROSE-NACL PER 500 ML: Performed by: PHYSICIAN ASSISTANT

## 2024-05-18 PROCEDURE — 71045 X-RAY EXAM CHEST 1 VIEW: CPT

## 2024-05-18 PROCEDURE — 84145 PROCALCITONIN (PCT): CPT | Performed by: EMERGENCY MEDICINE

## 2024-05-18 PROCEDURE — 96365 THER/PROPH/DIAG IV INF INIT: CPT

## 2024-05-18 PROCEDURE — 93005 ELECTROCARDIOGRAM TRACING: CPT

## 2024-05-18 PROCEDURE — 99285 EMERGENCY DEPT VISIT HI MDM: CPT

## 2024-05-18 PROCEDURE — 25510000001 IOPAMIDOL PER 1 ML: Performed by: EMERGENCY MEDICINE

## 2024-05-18 PROCEDURE — 81003 URINALYSIS AUTO W/O SCOPE: CPT | Performed by: PHYSICIAN ASSISTANT

## 2024-05-18 PROCEDURE — 71275 CT ANGIOGRAPHY CHEST: CPT

## 2024-05-18 RX ORDER — SODIUM CHLORIDE 0.9 % (FLUSH) 0.9 %
10 SYRINGE (ML) INJECTION AS NEEDED
Status: DISCONTINUED | OUTPATIENT
Start: 2024-05-18 | End: 2024-05-19 | Stop reason: HOSPADM

## 2024-05-18 RX ORDER — DEXTROSE AND SODIUM CHLORIDE 5; .9 G/100ML; G/100ML
1000 INJECTION, SOLUTION INTRAVENOUS ONCE
Status: COMPLETED | OUTPATIENT
Start: 2024-05-18 | End: 2024-05-18

## 2024-05-18 RX ADMIN — IOPAMIDOL 95 ML: 755 INJECTION, SOLUTION INTRAVENOUS at 22:03

## 2024-05-18 RX ADMIN — DEXTROSE AND SODIUM CHLORIDE 1000 ML: 5; 900 INJECTION, SOLUTION INTRAVENOUS at 17:58

## 2024-05-18 NOTE — ED PROVIDER NOTES
Pt presents to the ED c/o intermittent mild left-sided chest pressure in the setting of recent increased vomiting.  No significant shortness of breath, no cough.  Is approximately 34 weeks pregnant.  Currently denies any pain.     On exam,   General: No acute distress, nontoxic  HEENT: Mucous membranes moist, atraumatic, EOMI  Neck: Full ROM  Pulm: Symmetric chest rise, nonlabored, lungs CTAB  Cardiovascular: Regular rate and rhythm, intact distal pulses  GI: Soft, gravid uterus, no rebound, no guarding, bowel sounds present  MSK: Full ROM, no deformity  Skin: Warm, dry  Neuro: Awake, alert, oriented x 4, GCS 15, moving all extremities, no focal deficits  Psych: Calm, cooperative      EKG - Per my independent interpretation:    EKG Time: 1712  Rhythm/Rate: Sinus rhythm with a rate of 92  Normal axis  Normal intervals  Isolated T wave inversion lead III likely normal variant, no acute ischemic changes  No STEMI       Prior for comparison      Plan: Plan for labs, chest x-ray, EKG, and reevaluation with results.  Likely chest wall in nature given recent increased vomiting in pregnancy, I have a low concern at this point for PE.  No significant concern for ACS.  Overall well-appearing in no acute distress.    ED Course as of 05/19/24 1057   Sat May 18, 2024   1748 XR Chest 1 View  Per my independent interpretation of the chest x-ray, no evidence of pneumothorax [DC]   1830 WBC(!): 16.55 [DC]   1830 Hemoglobin(!): 11.4 [DC]   1830 Platelets: 294 [DC]   1830 Glucose: 80 [DC]   1830 BUN(!): 5 [DC]   1830 Creatinine(!): 0.44 [DC]   1830 Sodium(!): 134 [DC]   1830 Potassium: 4.1 [DC]   1830 ALT (SGPT): 12 [DC]   1830 AST (SGOT): 15 [DC]   1830 Alkaline Phosphatase(!): 229 [DC]   1830 Total Bilirubin: <0.2 [DC]   1830 HS Troponin T: <6 [DC]   1830 XR Chest 1 View [DC]   1831 XR Chest 1 View  Radiology report shows increased infrahilar markings on the right which could be technique related but cannot rule out pneumonia [DC]    1831 In the setting of x-ray findings with new leukocytosis will plan for discussion with OB about potentially a CT scan versus antibiotics and monitoring.  Clinically she does not necessarily present like a pneumonia, leukocytosis of unclear significance [DC]   2005 I discussed the patient's overall care with Dr. Lorenz, OB/GYN on call for Dr. Guerrero.  He recommends further imaging with a CT scan to evaluate for possible pneumonia and/or PE.  I discussed the risks and benefits with the patient.  She would like to proceed with the CTA. [AR]   2006 Fetal Heart Tones present with rate of 174-184 [AR]   2142 Patient's care turned over to Lj Weaver PA-C pending chest CTA and disposition. [AR]   2222 Updated patient on workup.  We will discharge.  She does have appropriate follow-up this week.  Vitals stable. [EE]      ED Course User Index  [AR] Cecilia Alvarado PA  [DC] Austin Li MD  [EE] Lj Weaver PA       Patient with reassuring CT scans, outpatient OB follow-up next week as scheduled, no emergent findings otherwise at this point ED return for worsening symptoms as needed.     MD Attestation Note    SHARED VISIT: This visit was performed by BOTH a physician and an APC. The substantive portion of the medical decision making was performed by this attesting physician who made or approved the management plan and takes responsibility for patient management. All studies in the APC note (if performed) were independently interpreted by me.                   Austin Li MD  05/19/24 1886

## 2024-05-18 NOTE — ED PROVIDER NOTES
" EMERGENCY DEPARTMENT ENCOUNTER    Room Number:  39/39  Date seen:  2024  PCP: Elizabeth Mcgowan APRN    Spoken Language:  English  Language interpretation services not needed     OB/GYN: Yousif Guerrero MD    HPI:  Chief Complaint: chest pain    A complete HPI/ROS/PMH/PSH/SH/FH are unobtainable due to: n/a    Context: Obie Jensen is a 24 y.o. female, currently 34w2d pregnant, presenting to the emergency department complaining of left-sided chest pain.  The history is being obtained by the patient and by review of the medical chart.  The patient states that she had a sudden onset of left-sided chest pain which began at approximately 3 PM today.  She states that it is still present, but that the severity has improved.  She states that when it onset, she also had diaphoresis.  The pain would increase with standing.  It is not tender to push on.  She describes this pain as \"pressure.\"  The patient is a smoker.  She states that she has chronic shortness of breath with ambulation, but denies any acute change in the level of shortness of breath.  She denies any familial history of cardiac disease.  She has never been evaluated by a cardiologist or had cardiac testing performed.  She states that her OB/GYN has made her an appointment to maternal-fetal medicine because her baby is not measuring the size that it should be.  She denies fever, cough, sore throat, abdominal pain.  She does endorse having recent increase in nausea with vomiting.    Medical Records Review:  The patient has been seen routinely by Dr. Guerrero throughout her pregnancy.  Her pregnancy is complicated by umbilical cord varix whish has been diagnosed on ultrasound.    PAST MEDICAL HISTORY  Active Ambulatory Problems     Diagnosis Date Noted    Female infertility 05/15/2023    Breech presentation with  problem 2024    Pregnancy complicated by umbilical cord varix in antepartum period 2024    Pregnancy, " "supervision, high-risk, third trimester 04/25/2024    Asymmetric IUGR affecting pregnancy, antepartum 05/09/2024     Resolved Ambulatory Problems     Diagnosis Date Noted    Irregular menses 01/08/2019    Supervision of normal first pregnancy, antepartum 12/15/2023    Low lying placenta nos or without hemorrhage, unspecified trimester 02/06/2024     Past Medical History:   Diagnosis Date    Eczema     Gonorrhea     PID (pelvic inflammatory disease)     Psoriasis     Weight loss, unintentional        PAST SURGICAL HISTORY  Past Surgical History:   Procedure Laterality Date    FOOT SURGERY Bilateral     HAND SURGERY         FAMILY HISTORY  Family History   Problem Relation Age of Onset    Thyroid cancer Father     Breast cancer Neg Hx     Colon cancer Neg Hx        SOCIAL HISTORY  Social History     Socioeconomic History    Marital status: Single   Tobacco Use    Smoking status: Every Day     Current packs/day: 1.00     Average packs/day: 1 pack/day for 5.0 years (5.0 ttl pk-yrs)     Types: Cigars, Cigarettes    Smokeless tobacco: Never   Vaping Use    Vaping status: Every Day   Substance and Sexual Activity    Alcohol use: No    Drug use: Yes     Types: Marijuana     Comment: \"occasionally to help with appetite\"    Sexual activity: Yes     Partners: Male     Birth control/protection: None, Same-sex partner       ALLERGIES  Penicillins and Latex    PHYSICAL EXAM  ED Triage Vitals [05/18/24 1710]   Temp Heart Rate Resp BP SpO2   97 °F (36.1 °C) 91 16 -- 98 %      Temp src Heart Rate Source Patient Position BP Location FiO2 (%)   Tympanic Monitor -- -- --       Physical Exam  Constitutional:       Appearance: Normal appearance.   HENT:      Head: Normocephalic and atraumatic.      Mouth/Throat:      Mouth: Mucous membranes are moist.   Eyes:      Extraocular Movements: Extraocular movements intact.      Pupils: Pupils are equal, round, and reactive to light.   Cardiovascular:      Rate and Rhythm: Normal rate and " regular rhythm.   Pulmonary:      Effort: Pulmonary effort is normal.      Breath sounds: Normal breath sounds.   Abdominal:      General: There is no distension.      Palpations: Abdomen is soft.      Tenderness: There is no abdominal tenderness.      Comments: Gravid abdomen   Musculoskeletal:      Cervical back: Normal range of motion and neck supple.   Neurological:      Mental Status: She is alert and oriented to person, place, and time. Mental status is at baseline.   Psychiatric:         Mood and Affect: Mood normal.         Behavior: Behavior normal.         Thought Content: Thought content normal.         Judgment: Judgment normal.         LAB RESULTS  Recent Results (from the past 24 hour(s))   ECG 12 Lead ED Triage Standing Order; Chest Pain    Collection Time: 05/18/24  5:12 PM   Result Value Ref Range    QT Interval 352 ms    QTC Interval 436 ms   Comprehensive Metabolic Panel    Collection Time: 05/18/24  5:52 PM    Specimen: Blood   Result Value Ref Range    Glucose 80 65 - 99 mg/dL    BUN 5 (L) 6 - 20 mg/dL    Creatinine 0.44 (L) 0.57 - 1.00 mg/dL    Sodium 134 (L) 136 - 145 mmol/L    Potassium 4.1 3.5 - 5.2 mmol/L    Chloride 103 98 - 107 mmol/L    CO2 17.5 (L) 22.0 - 29.0 mmol/L    Calcium 9.2 8.6 - 10.5 mg/dL    Total Protein 7.1 6.0 - 8.5 g/dL    Albumin 3.6 3.5 - 5.2 g/dL    ALT (SGPT) 12 1 - 33 U/L    AST (SGOT) 15 1 - 32 U/L    Alkaline Phosphatase 229 (H) 39 - 117 U/L    Total Bilirubin <0.2 0.0 - 1.2 mg/dL    Globulin 3.5 gm/dL    A/G Ratio 1.0 g/dL    BUN/Creatinine Ratio 11.4 7.0 - 25.0    Anion Gap 13.5 5.0 - 15.0 mmol/L    eGFR 137.0 >60.0 mL/min/1.73   High Sensitivity Troponin T    Collection Time: 05/18/24  5:52 PM    Specimen: Blood   Result Value Ref Range    HS Troponin T <6 <14 ng/L   Green Top (Gel)    Collection Time: 05/18/24  5:52 PM   Result Value Ref Range    Extra Tube Hold for add-ons.    Lavender Top    Collection Time: 05/18/24  5:52 PM   Result Value Ref Range    Extra  Tube hold for add-on    Gold Top - SST    Collection Time: 05/18/24  5:52 PM   Result Value Ref Range    Extra Tube Hold for add-ons.    Light Blue Top    Collection Time: 05/18/24  5:52 PM   Result Value Ref Range    Extra Tube Hold for add-ons.    CBC Auto Differential    Collection Time: 05/18/24  5:52 PM    Specimen: Blood   Result Value Ref Range    WBC 16.55 (H) 3.40 - 10.80 10*3/mm3    RBC 3.81 3.77 - 5.28 10*6/mm3    Hemoglobin 11.4 (L) 12.0 - 15.9 g/dL    Hematocrit 35.3 34.0 - 46.6 %    MCV 92.7 79.0 - 97.0 fL    MCH 29.9 26.6 - 33.0 pg    MCHC 32.3 31.5 - 35.7 g/dL    RDW 11.8 (L) 12.3 - 15.4 %    RDW-SD 39.7 37.0 - 54.0 fl    MPV 10.5 6.0 - 12.0 fL    Platelets 294 140 - 450 10*3/mm3    nRBC 0.0 0.0 - 0.2 /100 WBC   Manual Differential    Collection Time: 05/18/24  5:52 PM    Specimen: Blood   Result Value Ref Range    Neutrophil % 70.0 42.7 - 76.0 %    Lymphocyte % 21.0 19.6 - 45.3 %    Monocyte % 3.0 (L) 5.0 - 12.0 %    Eosinophil % 1.0 0.3 - 6.2 %    Basophil % 0.0 0.0 - 1.5 %    Myelocyte % 5.0 (H) 0.0 - 0.0 %    Neutrophils Absolute 11.59 (H) 1.70 - 7.00 10*3/mm3    Lymphocytes Absolute 3.48 (H) 0.70 - 3.10 10*3/mm3    Monocytes Absolute 0.50 0.10 - 0.90 10*3/mm3    Eosinophils Absolute 0.17 0.00 - 0.40 10*3/mm3    Basophils Absolute 0.00 0.00 - 0.20 10*3/mm3    Polychromasia Large/3+ None Seen    WBC Morphology Normal Normal    Giant Platelets Slight/1+ None Seen   Procalcitonin    Collection Time: 05/18/24  5:52 PM    Specimen: Blood   Result Value Ref Range    Procalcitonin 0.04 0.00 - 0.25 ng/mL   Urinalysis With Microscopic If Indicated (No Culture) - Urine, Clean Catch    Collection Time: 05/18/24  7:13 PM    Specimen: Urine, Clean Catch   Result Value Ref Range    Color, UA Yellow Yellow, Straw    Appearance, UA Clear Clear    pH, UA 6.5 5.0 - 8.0    Specific Gravity, UA 1.008 1.005 - 1.030    Glucose, UA Negative Negative    Ketones, UA Negative Negative    Bilirubin, UA Negative Negative     Blood, UA Negative Negative    Protein, UA Negative Negative    Leuk Esterase, UA Negative Negative    Nitrite, UA Negative Negative    Urobilinogen, UA 0.2 E.U./dL 0.2 - 1.0 E.U./dL       RADIOLOGY  Imaging Results (Last 24 Hours)       Procedure Component Value Units Date/Time    XR Chest 1 View [[id number]] Collected: 05/18/24 1758     Updated: 05/18/24 1802    Narrative:      XR CHEST 1 VW-     HISTORY: 24-year-old female with chest pain.     FINDINGS: There is increased density and markings in the infrahilar  region of the right lung. This may be related to technique and crowded  lung markings, but pneumonia can't be excluded. There are no effusions  or CHF. Follow-up with full inspiratory 2 views of the chest is  recommended.     This report was finalized on 5/18/2024 5:59 PM by Dr. Bianca Lane M.D on  Workstation: [id number]               PROCEDURES  Procedures  None    MEDICATIONS GIVEN IN ER  Medications   sodium chloride 0.9 % flush 10 mL (has no administration in time range)   dextrose 5 % and sodium chloride 0.9 % infusion 1,000 mL (0 mL Intravenous Stopped 5/18/24 2128)       MEDICAL DECISION MAKING, PROGRESS, and CONSULTS  Vital signs and nursing notes have all been reviewed by me.    All laboratory results have been independently interpreted by me.      Orders placed during this visit:  Orders Placed This Encounter   Procedures    XR Chest 1 View    CT Angiogram Chest    [place] Draw    Comprehensive Metabolic Panel    High Sensitivity Troponin T    CBC Auto Differential    Urinalysis With Microscopic If Indicated (No Culture) - Urine, Clean Catch    Manual Differential    Procalcitonin    NPO Diet NPO Type: Strict NPO    Undress & Gown    Continuous Pulse Oximetry    Monitor Fetal Heart Tones    OB/GYN (on-call MD unless specified)    Oxygen Therapy- Nasal Cannula; Titrate 1-6 LPM Per SpO2; 90 - 95%    ECG 12 Lead ED Triage Standing Order; Chest Pain    ECG 12 Lead ED Triage Standing Order; Chest  Pain    Insert Peripheral IV    Transfer Patient    CBC & Differential    Green Top (Gel)    Lavender Top    Gold Top - SST    Light Blue Top     Differential diagnosis includes but is not limited to:  Differential diagnosis includes but is not limited to:  -acute coronary syndrome  -pulmonary embolism  -thoracic aortic dissection  -pneumonia  -pneumothorax  -musculoskeletal pain  -GERD  -esophageal spasm  -anxiety  -myocarditis/pericarditis  -esophageal rupture  -pancreatitis.     HEART SCORE:    History #0  (Highly suspicious 2, Moderately suspicious 1, Slightly or non-suspicious 0)    ECG #0  (Significant ST depression 2,  Nonspecific repol disturbance 1, Normal 0)    Age #0  (> or = 65 2, 46-65 1,  < or = 45 0)    Risk factors #1  (hypercholesterolemia, HTN, DM, smoking, pos fam hx, obesity)  (> or = to 3 RF 2, 1 or 2 1, No risk factors 0)    Troponin #0  (> or = 3x normal limit 2, 1-3x normal limit 1, < or = Normal limit 0)    HEART Score Key:  Scores 0-3: 0.9-1.7% risk of adverse cardiac event. In the HEART Score study, these patients were discharged (0.99% in the retrospective study, 1.7% in the prospective study)  Scores 4-6: 12-16.6% risk of adverse cardiac event. In the HEART Score study, these patients were admitted to the hospital. (11.6% retrospective, 16.6% prospective)  Scores ?7: 50-65% risk of adverse cardiac event. In the HEART Score study, these patients were candidates for early invasive measures. (65.2% retrospective, 50.1% prospective)    This patient's HEART score is 1    Independent interpretation of labs, radiology studies, and discussions with consultants: Dr. Lorenz, OB/GYN    ED Course as of 05/18/24 2144   Sat May 18, 2024   1748 XR Chest 1 View  Per my independent interpretation of the chest x-ray, no evidence of pneumothorax [DC]   1830 WBC(!): 16.55 [DC]   1830 Hemoglobin(!): 11.4 [DC]   1830 Platelets: 294 [DC]   1830 Glucose: 80 [DC]   1830 BUN(!): 5 [DC]   1830 Creatinine(!): 0.44  [DC]   1830 Sodium(!): 134 [DC]   1830 Potassium: 4.1 [DC]   1830 ALT (SGPT): 12 [DC]   1830 AST (SGOT): 15 [DC]   1830 Alkaline Phosphatase(!): 229 [DC]   1830 Total Bilirubin: <0.2 [DC]   1830 HS Troponin T: <6 [DC]   1830 XR Chest 1 View [DC]   1831 XR Chest 1 View  Radiology report shows increased infrahilar markings on the right which could be technique related but cannot rule out pneumonia [DC]   1831 In the setting of x-ray findings with new leukocytosis will plan for discussion with OB about potentially a CT scan versus antibiotics and monitoring.  Clinically she does not necessarily present like a pneumonia, leukocytosis of unclear significance [DC]   2005 I discussed the patient's overall care with Dr. Lorenz, OB/GYN on call for Dr. Guerrero.  He recommends further imaging with a CT scan to evaluate for possible pneumonia and/or PE.  I discussed the risks and benefits with the patient.  She would like to proceed with the CTA. [AR]   2006 Fetal Heart Tones present with rate of 174-184 [AR]   2142 Patient's care turned over to Lj Weaver PA-C pending chest CTA and disposition. [AR]      ED Course User Index  [AR] Cecilia Alvaardo PA  [DC] Austin Li MD        Additional sources:  -External (non-ED) record review (see Medical Records Review section above)    -Chronic or social conditions impacting care: patient is currently pregnant    -Shared decision making: I discussed ED evaluation and treatment plan with patient who is in agreement.  Discussed at length ED testing.     DIAGNOSIS  Final diagnoses:   Chest pain, unspecified type     RX  Medications   sodium chloride 0.9 % flush 10 mL (has no administration in time range)   dextrose 5 % and sodium chloride 0.9 % infusion 1,000 mL (0 mL Intravenous Stopped 5/18/24 2128)          Medication List      No changes were made to your prescriptions during this visit.         Latest Documented Vital Signs:  As of 21:44 EDT  BP- 124/85 HR- 80 Temp- 97 °F  (36.1 °C) (Tympanic) O2 sat- 96%    Provider Attestation:  I personally reviewed the past medical history, past surgical history, social history, family history, current medications and allergies as they appear in the chart. I reviewed the patient's history, physical, lab/imaging results and overall care with Dr. Li who is in agreement with the patient's treatment plan.    EMR Dragon/Transcription disclaimer:  Dictated using Dragon dictation    Provider note signed by:    Note Disclaimer: At [facility], we believe that sharing information builds trust and better relationships. You are receiving this note because you are receiving care at [facility] or recently visited. It is possible you will see health information before a provider has talked with you about it. This kind of information can be easy to misunderstand. To help you fully understand what it means for your health, we urge you to discuss this note with your provider.     Cecilia Alvarado PA  05/18/24 0346

## 2024-05-19 NOTE — DISCHARGE INSTRUCTIONS
Although you are being discharged from the ED today, I encourage you to return for worsening symptoms. Things can, and do, change such that treatment at home with medication may not be adequate. Specifically I recommend returning for chest pain or discomfort, difficulty breathing, persistent vomiting or difficulty holding down liquids or medications, fever > 102.0 F or any other worsening or alarming symptoms.     Rest. Drink plenty of fluids.  Follow up with Dr. Guerrero for further evaluation and management.  Follow up with primary care provider for further management and to have blood pressure rechecked.  Call [phone] to get established with a new primary care provider if you do not already have one.

## 2024-05-21 ENCOUNTER — ROUTINE PRENATAL (OUTPATIENT)
Dept: OBSTETRICS AND GYNECOLOGY | Facility: CLINIC | Age: 24
End: 2024-05-21
Payer: MEDICAID

## 2024-05-21 VITALS — SYSTOLIC BLOOD PRESSURE: 123 MMHG | WEIGHT: 205 LBS | DIASTOLIC BLOOD PRESSURE: 82 MMHG | BODY MASS INDEX: 34.11 KG/M2

## 2024-05-21 DIAGNOSIS — O09.93 PREGNANCY, SUPERVISION, HIGH-RISK, THIRD TRIMESTER: Primary | ICD-10-CM

## 2024-05-21 DIAGNOSIS — O36.5990 ASYMMETRIC IUGR AFFECTING PREGNANCY, ANTEPARTUM: ICD-10-CM

## 2024-05-21 NOTE — PROGRESS NOTES
Chief complaint: Patient here for routine OB visit.    History of present illness: No major complaints at this time.  She denies contractions, vaginal bleeding, leakage of fluid.  She reports active fetal movement.    Patient did go to the emergency room 3 days ago with concerns of left-sided chest pain.  Workup for cardiac events or other ominous causes was negative.  She reports they feel this may be due to costochondritis.  She reports the chest pain has subsequently resolved and she has not had any further issues.    Objective: See vital signs in the flowsheet  General: No acute distress, awake and oriented x3  Extremities: No lower extremity edema, no calf tenderness  Psychiatric: Good judgment insight, normal affect and mood  Neurologic: Cranial nerves II through XII intact, no gross deficits    Ultrasound today:   Biophysical profile   Normal umbilical artery Dopplers  Normal amniotic fluid  Breech presentation    Assessment:  1.  24-year-old  1 at 34-5/7 weeks gestational age  2.  asymmetric fetal growth restriction  3.  Umbilical vein varix  4.  Breech presentation     Plan:  1.  Ultrasound findings from today were discussed with the patient.  Significance of today's ultrasound results were discussed.  Recommend continue weekly  testing with biophysical profile and umbilical artery Dopplers due to the asymmetric growth restriction.  Patient has first MFM appointment next week.  I will see her back in 2 weeks.  2.  We discussed persistent breech presentation.  We discussed options of external cephalic version versus primary  delivery if the baby is still breech at the time of labor/delivery.  Patient declines external cephalic version.  She requests primary  section if the baby is found to still be breech.

## 2024-05-28 ENCOUNTER — OFFICE VISIT (OUTPATIENT)
Dept: OBSTETRICS AND GYNECOLOGY | Facility: CLINIC | Age: 24
End: 2024-05-28
Payer: MEDICAID

## 2024-05-28 ENCOUNTER — PATIENT OUTREACH (OUTPATIENT)
Dept: LABOR AND DELIVERY | Facility: HOSPITAL | Age: 24
End: 2024-05-28
Payer: MEDICAID

## 2024-05-28 ENCOUNTER — HOSPITAL ENCOUNTER (OUTPATIENT)
Dept: ULTRASOUND IMAGING | Facility: HOSPITAL | Age: 24
Discharge: HOME | End: 2024-05-28
Admitting: OBSTETRICS & GYNECOLOGY
Payer: MEDICAID

## 2024-05-28 VITALS
WEIGHT: 206 LBS | HEART RATE: 104 BPM | HEIGHT: 65 IN | DIASTOLIC BLOOD PRESSURE: 89 MMHG | BODY MASS INDEX: 34.32 KG/M2 | SYSTOLIC BLOOD PRESSURE: 137 MMHG | TEMPERATURE: 98.2 F

## 2024-05-28 DIAGNOSIS — N43.3 HYDROCELE OF TESTIS: ICD-10-CM

## 2024-05-28 DIAGNOSIS — O26.899 PREGNANCY COMPLICATED BY UMBILICAL CORD VARIX IN ANTEPARTUM PERIOD: ICD-10-CM

## 2024-05-28 DIAGNOSIS — O36.5990 ASYMMETRIC IUGR AFFECTING PREGNANCY, ANTEPARTUM: Primary | ICD-10-CM

## 2024-05-28 DIAGNOSIS — O36.5991 IUGR (INTRAUTERINE GROWTH RESTRICTION) AFFECTING CARE OF MOTHER, UNSPECIFIED TRIMESTER, FETUS 1: ICD-10-CM

## 2024-05-28 PROCEDURE — 76811 OB US DETAILED SNGL FETUS: CPT

## 2024-05-28 PROCEDURE — 76820 UMBILICAL ARTERY ECHO: CPT

## 2024-05-28 PROCEDURE — 76819 FETAL BIOPHYS PROFIL W/O NST: CPT

## 2024-05-28 NOTE — PROGRESS NOTES
Pt reports that she is doing well and denies vaginal bleeding, cramping, contractions or LOF at this time. Reports active fetal movement. Reviewed when to call OB office or present to L&D for evaluation with symptoms such as decreased fetal movement, vaginal bleeding, LOF or ctxs. Pt verbalized understanding. Denies HA, visual changes or epigastric pain. Denies any additional complaints at time of appointment. Next OB appointment scheduled for 06/04/2024.    Vitals:    05/28/24 1315   BP: 137/89   Pulse: 104   Temp: 98.2 °F (36.8 °C)

## 2024-05-28 NOTE — PROGRESS NOTES
"MATERNAL FETAL MEDICINE Consult Note    Dear Dr Yousif Acevedo*:    Thank you for your kind referral of Obie Jensen.  As you know, she is a 24 y.o.   at  35  5/7 weeks gestation (Estimated Date of Delivery: 24). This is a consult.      Her antepartum course is complicated by:  FGR, normal dopplers  Fetal testicular hydrocele    Aneuploidy Screening: low risk    HPI: Today, she denies headache, blurry vision, RUQ pain. No vaginal bleeding, no contractions.     Review of History:  Past Medical History:   Diagnosis Date    Eczema     Gonorrhea     PID (pelvic inflammatory disease)     Psoriasis     Weight loss, unintentional      Past Surgical History:   Procedure Laterality Date    FOOT SURGERY Bilateral     HAND SURGERY         Social History     Socioeconomic History    Marital status: Single   Tobacco Use    Smoking status: Former     Current packs/day: 1.00     Average packs/day: 1 pack/day for 5.0 years (5.0 ttl pk-yrs)     Types: Cigars, Cigarettes    Smokeless tobacco: Never   Vaping Use    Vaping status: Every Day    Substances: Nicotine   Substance and Sexual Activity    Alcohol use: No    Drug use: Yes     Types: Marijuana     Comment: \"occasionally to help with appetite\"    Sexual activity: Yes     Partners: Male     Birth control/protection: None, Same-sex partner     Family History   Problem Relation Age of Onset    Thyroid cancer Father     Breast cancer Neg Hx     Colon cancer Neg Hx       Allergies   Allergen Reactions    Penicillins Unknown (See Comments) and Other (See Comments)     Mother has hx of pcn allergy, patient has never had pcn  Mother has hx of pcn allergy, patient has never had pcn  Other reaction(s): Unknown (See Comments)  Mother has hx of pcn allergy, patient has never had pcn    Latex Rash      Current Outpatient Medications on File Prior to Visit   Medication Sig Dispense Refill    ondansetron ODT (ZOFRAN-ODT) 4 MG disintegrating tablet Take 1 tablet by mouth " "Every 8 (Eight) Hours As Needed for Nausea or Vomiting. 30 tablet 1    Prenatal MV & Min w/FA-DHA (Prenatal Gummies) 0.18-25 MG chewable tablet Chew 1 each Daily. 30 tablet 11     No current facility-administered medications on file prior to visit.        Past obstetric, gynecological, medical, surgical, family and social history reviewed.  Relevant lab work and imaging reviewed.    Review of systems  Constitutional:  denies fever, chills, malaise.   ENT/Mouth:  denies sore throat, tinnitus  Eyes: denies vision changes/pain  CV:  denies chest pain  Respiratory:  denies cough/SOB  GI:  denies N/V, diarrhea, abdominal pain.    :   denies dysuria  Skin:  denies lesions or pruritus   Neuro:  denies weakness, focal neurologic symptoms    Vitals:    24 1315   BP: 137/89   BP Location: Right arm   Patient Position: Sitting   Pulse: 104   Temp: 98.2 °F (36.8 °C)   TempSrc: Temporal   Weight: 93.4 kg (206 lb)   Height: 165.1 cm (65\")       PHYSICAL EXAM   GENERAL: Not in acute distress, AAOx3, pleasant  CARDIO: regular rate and rhythm  PULM: symmetric chest rise, speaking in complete sentences without difficulty  NEURO: awake, alert and oriented to person, place, and time  ABDOMINAL: No fundal tenderness, no rebound or guarding, gravid  EXTREMITIES: no bilateral lower extremity edema/tenderness  SKIN: Warm, well-perfused      ULTRASOUND   Please view full ultrasound note on Imaging tab in ViewPoint.  Breech presentation.  Anterior placenta.  NOHEMI 8 cm, which is normal.   EFW 2303 g (16%, AC 4%)  BPP 8/8  Hydrocele seen in testes.  Anatomy visualized otherwise appears normal.   UA dopplers normal without elevated, absent, or reversed flow.      ASSESSMENT/COUNSELIN y.o.   at  35  5/7 weeks gestation (Estimated Date of Delivery: 24).     -Pregnancy  [ X ] stable  [   ] improving [  ] worsening    Diagnoses and all orders for this visit:    1. Asymmetric IUGR affecting pregnancy, antepartum " (Primary)    2. Hydrocele of testis         FGR, normal umbilical artery dopplers  We discussed the potential underlying etiologies of fetal growth restriction including but not limited to fetal chromosome disorder, syndromes, congenital infection, constitutional, and placental insufficiency.  FGR is associated with increased risk of stillbirth, abnormal heart rates patterns,  delivery, oligohydramnios, and cerebral palsy. She has had genetic screening this pregnancy which was low risk and no structural abnormalities seen on ultrasound, making aneuploidy/genetic causes less likely.  I did  her that I can never rule out genetic causes by ultrasound, but that her low risk cell free DNA screening was reassuring.  Cell free DNA does not check for all genetic issues.  With congenital infections, we often see profound CNS findings such as ventriculomegaly, echogenic bowel, and even hydrops.  We do not see any of this today.       I think the most likely cause of this is placental insufficiency. Timing of delivery is guided by the results  testing of fetal well being (ultrasound BPP, FHR tracing findings) in addition to Doppler findings (umbilical artery, MCA, and ductus venosus).  Today we have normal umbilical artery dopplers with an 8/8 BPP which is reassuring.  We discussed umbilical artery doppler studies as 4 possible levels: normal, elevated, absent, and reversed.  We discussed that absent and reversed would require admission and delivery at this gestational age.   I would recommend limitation of activities, daily fetal movement assessment, and twice weekly ANFS with weekly Doppler studies.       Finally, we discussed that placental insufficiency causing fetal growth restriction can be a heralding sign of pre-eclampsia.She has no evidence of this today, but should be observed for possible development as pregnancy progresses.  We discussed in detail symptoms of pre-eclampsia.     Hydrocele  Almost  always physiologic/normal variant.    Can be rarely associated with torsion/hernias--no evidence today.  Typically resolves at birth.  Will let NICU know for  US.  Pt informed.     Summary of Plan  -Return Friday at M for BPP/dopplers,  at OB for BPP.  -Careful attention to fetal movement and symptoms of pre-eclampsia  -Continue to follow with OB for prenatal care  -Delivery 38 weeks unless indicated sooner  - testicle ultrasound after delivery.      Follow-up: 1 week BPP/dopplers    Thank you for the consult and opportunity to care for this patient.  Please feel free to reach out with any questions or concerns.      I spent 35 minutes caring for this patient on this date of service. This time includes time spent by me in the following activities: preparing for the visit, reviewing tests, obtaining and/or reviewing a separately obtained history, performing a medically appropriate examination and/or evaluation, counseling and educating the patient/family/caregiver and independently interpreting results and communicating that information with the patient/family/caregiver with greater than 50% spent in counseling and coordination of care.       I spent 4 minutes on the separately reported service of US imaging not included in the time used to support the E/M service also reported today.      Unique Chan MD FACOG  Maternal Fetal Medicine-[facility]  Office: [phone]  [email or web address]

## 2024-05-28 NOTE — OUTREACH NOTE
[facility]  Check-In    Current Estimated Gestational Age: 35w5d      Questions/Answers      Flowsheet Row Responses   Best Method for Contacting Cell   Demographics Reviewed Yes   Currently Employed Yes   Able to keep appointments as scheduled Yes   Gender(s) and Name(s) m   Baby Active/Feeling Fetal Movemen Yes   How are you presently feeling? good   Questions regarding prenatal visits or tests to be ordered? No   Supplies ready for baby Car Seat, Clothing, Crib, Diapers, Feeding Supplies   Resource/Environmental Concerns None   Do you have any questions related to your care experience, your pregnancy, plans for delivery, any concerns, etc? No            Pt doing well today, MFM appt this afternoon, pt reports baby's stomach measuring small.  Has all supplies now.  No paid maternity leave, sent resources- [employer] and [employer].  Suggested [facility] and [facility] for peds.  Maternal warning s/s reviewed, pt verbalized understanding.  Encouraged to reach out with any needs.  Will follow up again IP.    Forrest Sinha RN  Maternity Nurse Navigator    5/28/2024, 12:18 EDT

## 2024-05-28 NOTE — LETTER
"May 28, 2024       No Recipients    Patient: Obie Jensen   YOB: 2000   Date of Visit: 2024       Dear Yousif Guerrero MD,    Thank you for referring Obie Jensen to me for evaluation. Below is a copy of my consult note.    If you have questions, please do not hesitate to call me. I look forward to following Obie along with you.         Sincerely,        Unique Chan MD      MATERNAL FETAL MEDICINE Consult Note    Dear Dr Yousif Acevedo*:    Thank you for your kind referral of Obie Jensen.  As you know, she is a 24 y.o.   at  35  5/7 weeks gestation (Estimated Date of Delivery: 24). This is a consult.      Her antepartum course is complicated by:  FGR, normal dopplers  Fetal testicular hydrocele    Aneuploidy Screening: low risk    HPI: Today, she denies headache, blurry vision, RUQ pain. No vaginal bleeding, no contractions.     Review of History:  Past Medical History:   Diagnosis Date   • Eczema    • Gonorrhea    • PID (pelvic inflammatory disease)    • Psoriasis    • Weight loss, unintentional      Past Surgical History:   Procedure Laterality Date   • FOOT SURGERY Bilateral    • HAND SURGERY         Social History     Socioeconomic History   • Marital status: Single   Tobacco Use   • Smoking status: Former     Current packs/day: 1.00     Average packs/day: 1 pack/day for 5.0 years (5.0 ttl pk-yrs)     Types: Cigars, Cigarettes   • Smokeless tobacco: Never   Vaping Use   • Vaping status: Every Day   • Substances: Nicotine   Substance and Sexual Activity   • Alcohol use: No   • Drug use: Yes     Types: Marijuana     Comment: \"occasionally to help with appetite\"   • Sexual activity: Yes     Partners: Male     Birth control/protection: None, Same-sex partner     Family History   Problem Relation Age of Onset   • Thyroid cancer Father    • Breast cancer Neg Hx    • Colon cancer Neg Hx       Allergies   Allergen Reactions   • Penicillins Unknown (See " "Comments) and Other (See Comments)     Mother has hx of pcn allergy, patient has never had pcn  Mother has hx of pcn allergy, patient has never had pcn  Other reaction(s): Unknown (See Comments)  Mother has hx of pcn allergy, patient has never had pcn   • Latex Rash      Current Outpatient Medications on File Prior to Visit   Medication Sig Dispense Refill   • ondansetron ODT (ZOFRAN-ODT) 4 MG disintegrating tablet Take 1 tablet by mouth Every 8 (Eight) Hours As Needed for Nausea or Vomiting. 30 tablet 1   • Prenatal MV & Min w/FA-DHA (Prenatal Gummies) 0.18-25 MG chewable tablet Chew 1 each Daily. 30 tablet 11     No current facility-administered medications on file prior to visit.        Past obstetric, gynecological, medical, surgical, family and social history reviewed.  Relevant lab work and imaging reviewed.    Review of systems  Constitutional:  denies fever, chills, malaise.   ENT/Mouth:  denies sore throat, tinnitus  Eyes: denies vision changes/pain  CV:  denies chest pain  Respiratory:  denies cough/SOB  GI:  denies N/V, diarrhea, abdominal pain.    :   denies dysuria  Skin:  denies lesions or pruritus   Neuro:  denies weakness, focal neurologic symptoms    Vitals:    05/28/24 1315   BP: 137/89   BP Location: Right arm   Patient Position: Sitting   Pulse: 104   Temp: 98.2 °F (36.8 °C)   TempSrc: Temporal   Weight: 93.4 kg (206 lb)   Height: 165.1 cm (65\")       PHYSICAL EXAM   GENERAL: Not in acute distress, AAOx3, pleasant  CARDIO: regular rate and rhythm  PULM: symmetric chest rise, speaking in complete sentences without difficulty  NEURO: awake, alert and oriented to person, place, and time  ABDOMINAL: No fundal tenderness, no rebound or guarding, gravid  EXTREMITIES: no bilateral lower extremity edema/tenderness  SKIN: Warm, well-perfused      ULTRASOUND   Please view full ultrasound note on Imaging tab in ViewPoint.  Breech presentation.  Anterior placenta.  NOHEMI 8 cm, which is normal.   EFW 2303 g " (16%, AC 4%)  BPP 8/8  Hydrocele seen in testes.  Anatomy visualized otherwise appears normal.   UA dopplers normal without elevated, absent, or reversed flow.      ASSESSMENT/COUNSELIN y.o.   at  35  5/7 weeks gestation (Estimated Date of Delivery: 24).     -Pregnancy  [ X ] stable  [   ] improving [  ] worsening    Diagnoses and all orders for this visit:    1. Asymmetric IUGR affecting pregnancy, antepartum (Primary)    2. Hydrocele of testis         FGR, normal umbilical artery dopplers  We discussed the potential underlying etiologies of fetal growth restriction including but not limited to fetal chromosome disorder, syndromes, congenital infection, constitutional, and placental insufficiency.  FGR is associated with increased risk of stillbirth, abnormal heart rates patterns,  delivery, oligohydramnios, and cerebral palsy. She has had genetic screening this pregnancy which was low risk and no structural abnormalities seen on ultrasound, making aneuploidy/genetic causes less likely.  I did  her that I can never rule out genetic causes by ultrasound, but that her low risk cell free DNA screening was reassuring.  Cell free DNA does not check for all genetic issues.  With congenital infections, we often see profound CNS findings such as ventriculomegaly, echogenic bowel, and even hydrops.  We do not see any of this today.       I think the most likely cause of this is placental insufficiency. Timing of delivery is guided by the results  testing of fetal well being (ultrasound BPP, FHR tracing findings) in addition to Doppler findings (umbilical artery, MCA, and ductus venosus).  Today we have normal umbilical artery dopplers with an 8/8 BPP which is reassuring.  We discussed umbilical artery doppler studies as 4 possible levels: normal, elevated, absent, and reversed.  We discussed that absent and reversed would require admission and delivery at this gestational age.   I would  recommend limitation of activities, daily fetal movement assessment, and twice weekly ANFS with weekly Doppler studies.       Finally, we discussed that placental insufficiency causing fetal growth restriction can be a heralding sign of pre-eclampsia.She has no evidence of this today, but should be observed for possible development as pregnancy progresses.  We discussed in detail symptoms of pre-eclampsia.     Hydrocele  Almost always physiologic/normal variant.    Can be rarely associated with torsion/hernias--no evidence today.  Typically resolves at birth.  Will let NICU know for  US.  Pt informed.     Summary of Plan  -Return Friday at [facility] for BPP/dopplers,  at OB for BPP.  -Careful attention to fetal movement and symptoms of pre-eclampsia  -Continue to follow with OB for prenatal care  -Delivery 38 weeks unless indicated sooner  - testicle ultrasound after delivery.      Follow-up: 1 week BPP/dopplers    Thank you for the consult and opportunity to care for this patient.  Please feel free to reach out with any questions or concerns.      I spent 35 minutes caring for this patient on this date of service. This time includes time spent by me in the following activities: preparing for the visit, reviewing tests, obtaining and/or reviewing a separately obtained history, performing a medically appropriate examination and/or evaluation, counseling and educating the patient/family/caregiver and independently interpreting results and communicating that information with the patient/family/caregiver with greater than 50% spent in counseling and coordination of care.       I spent 4 minutes on the separately reported service of US imaging not included in the time used to support the E/M service also reported today.      Unique Chan MD FACOG  Maternal Fetal Medicine-[facility]  Office: [phone]  [email or web address]

## 2024-05-29 ENCOUNTER — TRANSCRIBE ORDERS (OUTPATIENT)
Dept: ULTRASOUND IMAGING | Facility: HOSPITAL | Age: 24
End: 2024-05-29
Payer: MEDICAID

## 2024-05-29 DIAGNOSIS — O36.5991 IUGR (INTRAUTERINE GROWTH RESTRICTION) AFFECTING CARE OF MOTHER, UNSPECIFIED TRIMESTER, FETUS 1: Primary | ICD-10-CM

## 2024-05-29 DIAGNOSIS — O26.899 PREGNANCY COMPLICATED BY UMBILICAL CORD VARIX IN ANTEPARTUM PERIOD: ICD-10-CM

## 2024-05-30 ENCOUNTER — TELEPHONE (OUTPATIENT)
Dept: OBSTETRICS AND GYNECOLOGY | Facility: CLINIC | Age: 24
End: 2024-05-30

## 2024-05-30 NOTE — TELEPHONE ENCOUNTER
ROBYN DIETZ     [phone]    PT IS SUPPOSE TO HAVE A C SECTION,     PT WOULD LIKE AN EXACT DATE SHE IS NOT FEELING WELL.     NEEDS TO LET JOB KNOW AS WELL

## 2024-05-31 ENCOUNTER — PREP FOR SURGERY (OUTPATIENT)
Dept: OTHER | Facility: HOSPITAL | Age: 24
End: 2024-05-31
Payer: MEDICAID

## 2024-05-31 RX ORDER — ACETAMINOPHEN 500 MG
1000 TABLET ORAL ONCE
OUTPATIENT
Start: 2024-05-31 | End: 2024-05-31

## 2024-05-31 RX ORDER — OXYTOCIN/0.9 % SODIUM CHLORIDE 30/500 ML
250 PLASTIC BAG, INJECTION (ML) INTRAVENOUS CONTINUOUS
OUTPATIENT
Start: 2024-05-31 | End: 2024-05-31

## 2024-05-31 RX ORDER — SODIUM CHLORIDE, SODIUM LACTATE, POTASSIUM CHLORIDE, CALCIUM CHLORIDE 600; 310; 30; 20 MG/100ML; MG/100ML; MG/100ML; MG/100ML
125 INJECTION, SOLUTION INTRAVENOUS CONTINUOUS
OUTPATIENT
Start: 2024-05-31

## 2024-05-31 RX ORDER — CARBOPROST TROMETHAMINE 250 UG/ML
250 INJECTION, SOLUTION INTRAMUSCULAR AS NEEDED
OUTPATIENT
Start: 2024-05-31

## 2024-05-31 RX ORDER — ONDANSETRON 4 MG/1
4 TABLET, ORALLY DISINTEGRATING ORAL EVERY 6 HOURS PRN
OUTPATIENT
Start: 2024-05-31

## 2024-05-31 RX ORDER — SODIUM CHLORIDE 0.9 % (FLUSH) 0.9 %
10 SYRINGE (ML) INJECTION AS NEEDED
OUTPATIENT
Start: 2024-05-31

## 2024-05-31 RX ORDER — LIDOCAINE HYDROCHLORIDE 10 MG/ML
0.5 INJECTION, SOLUTION INFILTRATION; PERINEURAL ONCE AS NEEDED
OUTPATIENT
Start: 2024-05-31

## 2024-05-31 RX ORDER — METHYLERGONOVINE MALEATE 0.2 MG/ML
200 INJECTION INTRAVENOUS AS NEEDED
OUTPATIENT
Start: 2024-05-31

## 2024-05-31 RX ORDER — TRANEXAMIC ACID 10 MG/ML
1000 INJECTION, SOLUTION INTRAVENOUS ONCE AS NEEDED
OUTPATIENT
Start: 2024-05-31

## 2024-05-31 RX ORDER — OXYTOCIN/0.9 % SODIUM CHLORIDE 30/500 ML
999 PLASTIC BAG, INJECTION (ML) INTRAVENOUS ONCE
OUTPATIENT
Start: 2024-05-31

## 2024-05-31 RX ORDER — MISOPROSTOL 200 UG/1
800 TABLET ORAL AS NEEDED
OUTPATIENT
Start: 2024-05-31

## 2024-05-31 RX ORDER — ONDANSETRON 2 MG/ML
4 INJECTION INTRAMUSCULAR; INTRAVENOUS EVERY 6 HOURS PRN
OUTPATIENT
Start: 2024-05-31

## 2024-05-31 RX ORDER — SODIUM CHLORIDE 0.9 % (FLUSH) 0.9 %
10 SYRINGE (ML) INJECTION EVERY 12 HOURS SCHEDULED
OUTPATIENT
Start: 2024-05-31

## 2024-05-31 RX ORDER — KETOROLAC TROMETHAMINE 30 MG/ML
30 INJECTION, SOLUTION INTRAMUSCULAR; INTRAVENOUS ONCE
OUTPATIENT
Start: 2024-05-31 | End: 2024-05-31

## 2024-05-31 NOTE — TELEPHONE ENCOUNTER
Please let the patient know that her  is scheduled for  at 12:30 PM.  She will be exactly 38 weeks at that time.  [facility] has recommended delivery at 38 weeks.

## 2024-06-03 ENCOUNTER — DOCUMENTATION (OUTPATIENT)
Dept: NURSERY | Facility: HOSPITAL | Age: 24
End: 2024-06-03
Payer: MEDICAID

## 2024-06-03 NOTE — PROGRESS NOTES
DATE: 6/3/2024  MRN: [id number]      Patient Name: Obie Jensen  YOB: 2000    Dear Health Care Provider,    The patient listed above was discussed at the [facility] Fetal Care Conference.     The fetal diagnosis is: Fetal Growth Restriction and Testicular Hydrocoele    Recommendations:  Delivery is currently planned at [facility]    Plan is for the baby to be admitted to the [facility] if there are no additional concerns    After admission the plan is for  Ultrasound of the Scrotum    Please be aware that the plan may change if more information is obtained during the prenatal course.    Please feel free to call with any questions:    Maternal Fetal Medicine at [phone]  Pediatric Cardiology at [phone]  Neonatology at [phone]    Electronically signed by Valeriano Lynn MD, 24, 8:12 AM EDT.

## 2024-06-04 ENCOUNTER — ROUTINE PRENATAL (OUTPATIENT)
Dept: OBSTETRICS AND GYNECOLOGY | Facility: CLINIC | Age: 24
End: 2024-06-04
Payer: MEDICAID

## 2024-06-04 VITALS — BODY MASS INDEX: 34.45 KG/M2 | WEIGHT: 207 LBS | DIASTOLIC BLOOD PRESSURE: 73 MMHG | SYSTOLIC BLOOD PRESSURE: 126 MMHG

## 2024-06-04 DIAGNOSIS — Z3A.36 36 WEEKS GESTATION OF PREGNANCY: Primary | ICD-10-CM

## 2024-06-04 DIAGNOSIS — O26.899 PREGNANCY COMPLICATED BY UMBILICAL CORD VARIX IN ANTEPARTUM PERIOD: ICD-10-CM

## 2024-06-04 DIAGNOSIS — O09.93 PREGNANCY, SUPERVISION, HIGH-RISK, THIRD TRIMESTER: ICD-10-CM

## 2024-06-04 DIAGNOSIS — O36.5990 ASYMMETRIC IUGR AFFECTING PREGNANCY, ANTEPARTUM: ICD-10-CM

## 2024-06-04 LAB
GLUCOSE UR STRIP-MCNC: NEGATIVE MG/DL
PROT UR STRIP-MCNC: NEGATIVE MG/DL

## 2024-06-04 PROCEDURE — 99214 OFFICE O/P EST MOD 30 MIN: CPT | Performed by: OBSTETRICS & GYNECOLOGY

## 2024-06-04 NOTE — PROGRESS NOTES
Chief complaint: Prenatal visit  History of present illness: Patient is here for her routine prenatal visit.  She is without major complaints at this time.  She denies vaginal bleeding, leakage of fluid.  She reports active fetal movement.      Objective: See vital signs in the flowsheet  General: No acute distress, awake and oriented x3  Abdomen: Soft, nontender, gravid, fetal heart tones 137  Pelvic exam performed in the presence of a female chaperone.  Patient provided verbal consent to proceed with exam today.  Normal external female genitalia, no lesions  No vaginal discharge or bleeding  Cervix: Closed/thick/-3  Extremities: No lower extremity edema, no calf tenderness    Ultrasound today:  Biophysical profile   Normal amniotic fluid  Normal umbilical artery Dopplers  Larry breech presentation    Assessment:  1.  24-year-old  1 at 36-5/7 weeks gestational age  2.  Fetal growth restriction  3.  Breech presentation  4.  Umbilical cord varix    Plan:  1.  Ultrasound findings were discussed with the patient.  Limitations of ultrasound were explained.  Continue twice weekly  testing alternating between here and maternal-fetal medicine.  2.  Discussed recommendations for delivery at 38 weeks gestation secondary to fetal growth restriction.  Risk, benefits, alternatives discussed with the patient.  Risks of iatrogenic prematurity were discussed with the patient.  Explained that due to fetal growth restriction it is felt that the risks of expectant management beyond 38 weeks gestation outweigh the risks of delivery.  3.  Explained that due to breech presentation, recommended route of delivery is via  section.  The patient has previously declined external cephalic version and does wish to proceed with primary  section if persistently breech.  4.  GBS performed today.    Return in 1 week.

## 2024-06-06 ENCOUNTER — OFFICE VISIT (OUTPATIENT)
Dept: OBSTETRICS AND GYNECOLOGY | Facility: CLINIC | Age: 24
End: 2024-06-06
Payer: MEDICAID

## 2024-06-06 ENCOUNTER — HOSPITAL ENCOUNTER (OUTPATIENT)
Dept: ULTRASOUND IMAGING | Facility: HOSPITAL | Age: 24
Discharge: HOME | End: 2024-06-06
Payer: MEDICAID

## 2024-06-06 ENCOUNTER — TELEPHONE (OUTPATIENT)
Dept: OBSTETRICS AND GYNECOLOGY | Facility: CLINIC | Age: 24
End: 2024-06-06
Payer: MEDICAID

## 2024-06-06 ENCOUNTER — HOSPITAL ENCOUNTER (INPATIENT)
Facility: HOSPITAL | Age: 24
LOS: 3 days | Discharge: HOME | End: 2024-06-09
Attending: OBSTETRICS & GYNECOLOGY | Admitting: OBSTETRICS & GYNECOLOGY
Payer: MEDICAID

## 2024-06-06 VITALS
BODY MASS INDEX: 34.66 KG/M2 | DIASTOLIC BLOOD PRESSURE: 76 MMHG | WEIGHT: 208 LBS | TEMPERATURE: 98 F | HEIGHT: 65 IN | SYSTOLIC BLOOD PRESSURE: 134 MMHG | HEART RATE: 82 BPM

## 2024-06-06 DIAGNOSIS — Z98.891 S/P CESAREAN SECTION: Primary | ICD-10-CM

## 2024-06-06 DIAGNOSIS — O36.5990 FETAL GROWTH RESTRICTION ANTEPARTUM: ICD-10-CM

## 2024-06-06 DIAGNOSIS — N43.3 HYDROCELE OF TESTIS: Primary | ICD-10-CM

## 2024-06-06 PROBLEM — Z34.90 PREGNANCY: Status: ACTIVE | Noted: 2024-06-06

## 2024-06-06 PROBLEM — O28.8 AFI (AMNIOTIC FLUID INDEX) DECREASED: Status: ACTIVE | Noted: 2024-06-06

## 2024-06-06 PROBLEM — O36.5930 INTRAUTERINE GROWTH RESTRICTION (IUGR) AFFECTING CARE OF MOTHER, THIRD TRIMESTER, SINGLE GESTATION: Status: ACTIVE | Noted: 2024-06-06

## 2024-06-06 LAB
ABO GROUP BLD: NORMAL
ALBUMIN SERPL-MCNC: 3.4 G/DL (ref 3.5–5.2)
ALBUMIN/GLOB SERPL: 1 G/DL
ALP SERPL-CCNC: 299 U/L (ref 39–117)
ALT SERPL W P-5'-P-CCNC: 8 U/L (ref 1–33)
ANION GAP SERPL CALCULATED.3IONS-SCNC: 10 MMOL/L (ref 5–15)
AST SERPL-CCNC: 11 U/L (ref 1–32)
BILIRUB SERPL-MCNC: 0.2 MG/DL (ref 0–1.2)
BLD GP AB SCN SERPL QL: NEGATIVE
BUN SERPL-MCNC: 6 MG/DL (ref 6–20)
BUN/CREAT SERPL: 15 (ref 7–25)
CALCIUM SPEC-SCNC: 8.8 MG/DL (ref 8.6–10.5)
CHLORIDE SERPL-SCNC: 105 MMOL/L (ref 98–107)
CO2 SERPL-SCNC: 19 MMOL/L (ref 22–29)
CREAT SERPL-MCNC: 0.4 MG/DL (ref 0.57–1)
DEPRECATED RDW RBC AUTO: 37.8 FL (ref 37–54)
EGFRCR SERPLBLD CKD-EPI 2021: 140.2 ML/MIN/1.73
ERYTHROCYTE [DISTWIDTH] IN BLOOD BY AUTOMATED COUNT: 11.9 % (ref 12.3–15.4)
GLOBULIN UR ELPH-MCNC: 3.5 GM/DL
GLUCOSE SERPL-MCNC: 87 MG/DL (ref 65–99)
HCT VFR BLD AUTO: 34.1 % (ref 34–46.6)
HGB BLD-MCNC: 11.5 G/DL (ref 12–15.9)
MCH RBC QN AUTO: 29.4 PG (ref 26.6–33)
MCHC RBC AUTO-ENTMCNC: 33.7 G/DL (ref 31.5–35.7)
MCV RBC AUTO: 87.2 FL (ref 79–97)
PLATELET # BLD AUTO: 358 10*3/MM3 (ref 140–450)
PMV BLD AUTO: 10.3 FL (ref 6–12)
POTASSIUM SERPL-SCNC: 3.8 MMOL/L (ref 3.5–5.2)
PROT SERPL-MCNC: 6.9 G/DL (ref 6–8.5)
RBC # BLD AUTO: 3.91 10*6/MM3 (ref 3.77–5.28)
RH BLD: POSITIVE
SODIUM SERPL-SCNC: 134 MMOL/L (ref 136–145)
T PALLIDUM IGG SER QL: NORMAL
T&S EXPIRATION DATE: NORMAL
WBC NRBC COR # BLD AUTO: 12.24 10*3/MM3 (ref 3.4–10.8)

## 2024-06-06 PROCEDURE — 59025 FETAL NON-STRESS TEST: CPT

## 2024-06-06 PROCEDURE — 86900 BLOOD TYPING SEROLOGIC ABO: CPT | Performed by: OBSTETRICS & GYNECOLOGY

## 2024-06-06 PROCEDURE — 86850 RBC ANTIBODY SCREEN: CPT | Performed by: OBSTETRICS & GYNECOLOGY

## 2024-06-06 PROCEDURE — 85027 COMPLETE CBC AUTOMATED: CPT | Performed by: OBSTETRICS & GYNECOLOGY

## 2024-06-06 PROCEDURE — 80053 COMPREHEN METABOLIC PANEL: CPT | Performed by: OBSTETRICS & GYNECOLOGY

## 2024-06-06 PROCEDURE — 76820 UMBILICAL ARTERY ECHO: CPT

## 2024-06-06 PROCEDURE — 99222 1ST HOSP IP/OBS MODERATE 55: CPT | Performed by: OBSTETRICS & GYNECOLOGY

## 2024-06-06 PROCEDURE — 25810000003 LACTATED RINGERS PER 1000 ML: Performed by: NURSE PRACTITIONER

## 2024-06-06 PROCEDURE — 25810000003 LACTATED RINGERS SOLUTION: Performed by: NURSE PRACTITIONER

## 2024-06-06 PROCEDURE — 86901 BLOOD TYPING SEROLOGIC RH(D): CPT | Performed by: OBSTETRICS & GYNECOLOGY

## 2024-06-06 PROCEDURE — 86780 TREPONEMA PALLIDUM: CPT | Performed by: OBSTETRICS & GYNECOLOGY

## 2024-06-06 PROCEDURE — 59025 FETAL NON-STRESS TEST: CPT | Performed by: OBSTETRICS & GYNECOLOGY

## 2024-06-06 PROCEDURE — 76819 FETAL BIOPHYS PROFIL W/O NST: CPT

## 2024-06-06 RX ORDER — SODIUM CHLORIDE 0.9 % (FLUSH) 0.9 %
10 SYRINGE (ML) INJECTION EVERY 12 HOURS SCHEDULED
Status: DISCONTINUED | OUTPATIENT
Start: 2024-06-06 | End: 2024-06-07

## 2024-06-06 RX ORDER — SODIUM CHLORIDE, SODIUM LACTATE, POTASSIUM CHLORIDE, CALCIUM CHLORIDE 600; 310; 30; 20 MG/100ML; MG/100ML; MG/100ML; MG/100ML
125 INJECTION, SOLUTION INTRAVENOUS CONTINUOUS
Status: DISCONTINUED | OUTPATIENT
Start: 2024-06-06 | End: 2024-06-07

## 2024-06-06 RX ORDER — DOCUSATE SODIUM 100 MG/1
100 CAPSULE, LIQUID FILLED ORAL 2 TIMES DAILY PRN
Status: DISCONTINUED | OUTPATIENT
Start: 2024-06-06 | End: 2024-06-07

## 2024-06-06 RX ORDER — ACETAMINOPHEN 325 MG/1
650 TABLET ORAL EVERY 4 HOURS PRN
Status: DISCONTINUED | OUTPATIENT
Start: 2024-06-06 | End: 2024-06-07

## 2024-06-06 RX ORDER — BISACODYL 10 MG
10 SUPPOSITORY, RECTAL RECTAL DAILY PRN
Status: DISCONTINUED | OUTPATIENT
Start: 2024-06-06 | End: 2024-06-07

## 2024-06-06 RX ORDER — LIDOCAINE HYDROCHLORIDE 10 MG/ML
0.5 INJECTION, SOLUTION INFILTRATION; PERINEURAL ONCE AS NEEDED
Status: DISCONTINUED | OUTPATIENT
Start: 2024-06-06 | End: 2024-06-07

## 2024-06-06 RX ORDER — CALCIUM CARBONATE 500 MG/1
2 TABLET, CHEWABLE ORAL 3 TIMES DAILY PRN
Status: DISCONTINUED | OUTPATIENT
Start: 2024-06-06 | End: 2024-06-07

## 2024-06-06 RX ORDER — ONDANSETRON 4 MG/1
8 TABLET, ORALLY DISINTEGRATING ORAL EVERY 8 HOURS PRN
Status: DISCONTINUED | OUTPATIENT
Start: 2024-06-06 | End: 2024-06-07

## 2024-06-06 RX ORDER — SODIUM CHLORIDE 0.9 % (FLUSH) 0.9 %
10 SYRINGE (ML) INJECTION AS NEEDED
Status: DISCONTINUED | OUTPATIENT
Start: 2024-06-06 | End: 2024-06-07

## 2024-06-06 RX ORDER — ONDANSETRON 2 MG/ML
4 INJECTION INTRAMUSCULAR; INTRAVENOUS EVERY 8 HOURS PRN
Status: DISCONTINUED | OUTPATIENT
Start: 2024-06-06 | End: 2024-06-07

## 2024-06-06 RX ADMIN — ACETAMINOPHEN 325MG 650 MG: 325 TABLET ORAL at 21:44

## 2024-06-06 RX ADMIN — Medication 10 ML: at 20:16

## 2024-06-06 RX ADMIN — SODIUM CHLORIDE, POTASSIUM CHLORIDE, SODIUM LACTATE AND CALCIUM CHLORIDE 125 ML/HR: 600; 310; 30; 20 INJECTION, SOLUTION INTRAVENOUS at 12:41

## 2024-06-06 RX ADMIN — SODIUM CHLORIDE, POTASSIUM CHLORIDE, SODIUM LACTATE AND CALCIUM CHLORIDE 1000 ML: 600; 310; 30; 20 INJECTION, SOLUTION INTRAVENOUS at 11:42

## 2024-06-06 NOTE — LETTER
"2024       No Recipients    Patient: Obie Jensen   YOB: 2000   Date of Visit: 2024       Dear Yousif Guerrero MD    Obie Jensen was in my office today. Below is a copy of my note.    If you have questions, please do not hesitate to call me. I look forward to following Obie along with you.         Sincerely,        Marlena Hirsch, LEANDRA        CC:   No Recipients                            MATERNAL FETAL MEDICINE Consult Note    Dear Dr Yousif Acevedo*:    Thank you for your kind referral of Obie Jensen.  As you know, she is a 24 y.o.   at  37  0/7 weeks gestation (Estimated Date of Delivery: 24). This is a consult.      Her antepartum course is complicated by:  FGR, normal dopplers  Fetal testicular hydrocele  Borderline Oligohydramnios    Aneuploidy Screening: low risk    HPI: Today, she denies headache, blurry vision, RUQ pain. No vaginal bleeding, no contractions.     Review of History:  Past Medical History:   Diagnosis Date   • Eczema    • Gonorrhea    • PID (pelvic inflammatory disease)    • Psoriasis    • Weight loss, unintentional      Past Surgical History:   Procedure Laterality Date   • FOOT SURGERY Bilateral    • HAND SURGERY         Social History     Socioeconomic History   • Marital status: Single   Tobacco Use   • Smoking status: Former     Current packs/day: 1.00     Average packs/day: 1 pack/day for 5.0 years (5.0 ttl pk-yrs)     Types: Cigars, Cigarettes   • Smokeless tobacco: Never   Vaping Use   • Vaping status: Every Day   • Substances: Nicotine   Substance and Sexual Activity   • Alcohol use: No   • Drug use: Yes     Types: Marijuana     Comment: \"occasionally to help with appetite\"   • Sexual activity: Yes     Partners: Male     Birth control/protection: None, Same-sex partner     Family History   Problem Relation Age of Onset   • Thyroid cancer Father    • Breast cancer Neg Hx    • Colon cancer Neg Hx       Allergies " "  Allergen Reactions   • Penicillins Unknown (See Comments) and Other (See Comments)     Mother has hx of pcn allergy, patient has never had pcn  Mother has hx of pcn allergy, patient has never had pcn  Other reaction(s): Unknown (See Comments)  Mother has hx of pcn allergy, patient has never had pcn   • Latex Rash      Current Outpatient Medications on File Prior to Visit   Medication Sig Dispense Refill   • ondansetron ODT (ZOFRAN-ODT) 4 MG disintegrating tablet Take 1 tablet by mouth Every 8 (Eight) Hours As Needed for Nausea or Vomiting. 30 tablet 1   • Prenatal MV & Min w/FA-DHA (Prenatal Gummies) 0.18-25 MG chewable tablet Chew 1 each Daily. 30 tablet 11     No current facility-administered medications on file prior to visit.        Past obstetric, gynecological, medical, surgical, family and social history reviewed.  Relevant lab work and imaging reviewed.    Review of systems  Constitutional:  denies fever, chills, malaise.   ENT/Mouth:  denies sore throat, tinnitus  Eyes: denies vision changes/pain  CV:  denies chest pain  Respiratory:  denies cough/SOB  GI:  denies N/V, diarrhea, abdominal pain.    :   denies dysuria  Skin:  denies lesions or pruritus   Neuro:  denies weakness, focal neurologic symptoms    Vitals:    06/06/24 0834   BP: 134/76   BP Location: Right arm   Patient Position: Sitting   Pulse: 82   Temp: 98 °F (36.7 °C)   TempSrc: Temporal   Weight: 94.3 kg (208 lb)   Height: 165.1 cm (65\")       PHYSICAL EXAM   GENERAL: Not in acute distress, AAOx3, pleasant  CARDIO: regular rate and rhythm  PULM: symmetric chest rise, speaking in complete sentences without difficulty  NEURO: awake, alert and oriented to person, place, and time  ABDOMINAL: No fundal tenderness, no rebound or guarding, gravid  EXTREMITIES: no bilateral lower extremity edema/tenderness  SKIN: Warm, well-perfused      ULTRASOUND   Please view full ultrasound note on Imaging tab in ViewPoint.  Breech presentation.  Anterior " placenta.  NOHEMI 5-6 cm (2x2 pocket), consistent with borderline oligohydramnios.  BPP 8/8  Normal UA dopplers without elevated, absent, or reversed flow.  Limited visualized anatomy appears normal.    ASSESSMENT/COUNSELIN y.o.   at  37  0/7 weeks gestation (Estimated Date of Delivery: 24).     -Pregnancy  [ X ] stable  [   ] improving [  ] worsening    Diagnoses and all orders for this visit:    1. Hydrocele of testis (Primary)    2. Fetal growth restriction antepartum      FGR, NORMAL UMBILICAL ARTERY DOPPLERS  Previously Counseled.  We discussed the potential underlying etiologies of fetal growth restriction including but not limited to fetal chromosome disorder, syndromes, congenital infection, constitutional, and placental insufficiency.  FGR is associated with increased risk of stillbirth, abnormal heart rates patterns,  delivery, oligohydramnios, and cerebral palsy. She has had genetic screening this pregnancy which was low risk and no structural abnormalities seen on ultrasound, making aneuploidy/genetic causes less likely.  I did  her that I can never rule out genetic causes by ultrasound, but that her low risk cell free DNA screening was reassuring.  Cell free DNA does not check for all genetic issues.  With congenital infections, we often see profound CNS findings such as ventriculomegaly, echogenic bowel, and even hydrops.  We do not see any of this today.       I think the most likely cause of this is placental insufficiency. Timing of delivery is guided by the results  testing of fetal well being (ultrasound BPP, FHR tracing findings) in addition to Doppler findings (umbilical artery, MCA, and ductus venosus).  Today we have normal umbilical artery dopplers with an 8/8 BPP which is reassuring.  We discussed umbilical artery doppler studies as 4 possible levels: normal, elevated, absent, and reversed.  We discussed that absent and reversed would require admission and  delivery at this gestational age.   I would recommend limitation of activities, daily fetal movement assessment, and twice weekly ANFS with weekly Doppler studies.       Finally, we discussed that placental insufficiency causing fetal growth restriction can be a heralding sign of pre-eclampsia.She has no evidence of this today, but should be observed for possible development as pregnancy progresses.  We discussed in detail symptoms of pre-eclampsia.     HYDROCELE  Previously Counseled.  Almost always physiologic/normal variant.    Can be rarely associated with torsion/hernias--no evidence today.  Typically resolves at birth.  Will let NICU know for  US.  Pt informed.     BORDERLINE OLIGO (NOHEMI 5-6 cm)  We discussed today's ultrasound findings and the volume of amniotic fluid which is low normal, but not indicative of diagnosis of oligohydramnios on our scan.       As an isolated finding and in the context of reassuring measures of fetal well-being (BPP), amniotic fluid volumes in this range have not been found to be predictive of adverse  outcomes.       Low amniotic fluid volumes may be an early indicator of placental dysfunction.  This baby is growth restricted with normal UA dopplers as well.  Some cases of oligohydramnios may be an indicator of underlying fetal abnormality, syndrome, or genetic disorder.  However, in the context of a structurally normal appearing fetus, the absolute rate of clinically significant fetal abnormality is low. Certainly,  disorders such as bladder outlet obstruction, renal agenesis, or bilateral MCDK can also be the cause, as well as PPROM.  We see no evidence of  issues on US today.      I think most likely this baby's fluid hangs out at low normal (was 7-8 cm last week).       Recommend that she be admitted to L&D for observation, receive IV hydration overnight and we will repeat BPP in AM to evaluate NOHEMI again.  If NOHEMI remains low/borderline low tomorrow, would  proceed with delivery.   Patient is scheduled for primary  for breech on 24    Summary of Plan  -Careful attention to fetal movement and symptoms of pre-eclampsia  -Continue to follow with OB for prenatal care  -Delivery 38 weeks unless indicated sooner  - testicle ultrasound after delivery.        Thank you for the consult and opportunity to care for this patient.  Please feel free to reach out with any questions or concerns.      I spent 20 minutes caring for this patient on this date of service. This time includes time spent by me in the following activities: preparing for the visit, reviewing tests, obtaining and/or reviewing a separately obtained history, performing a medically appropriate examination and/or evaluation, counseling and educating the patient/family/caregiver and independently interpreting results and communicating that information with the patient/family/caregiver with greater than 50% spent in counseling and coordination of care.     LEANDRA Marroquin  Maternal Fetal Medicine-[facility]  Office: [phone]  [email or web address]

## 2024-06-06 NOTE — NON STRESS TEST
Obie Jensen, a  at 37w0d with an ARIADNA of 2024, by Last Menstrual Period, was seen at [address] for a nonstress test.    Chief Complaint   Patient presents with    Abnormal Prenatal Ultrasound     Antepartum admit for FGR & Low NOHEMI       Patient Active Problem List   Diagnosis    Female infertility    Breech presentation with  problem    Pregnancy complicated by umbilical cord varix in antepartum period    Pregnancy, supervision, high-risk, third trimester    Asymmetric IUGR affecting pregnancy, antepartum    Intrauterine growth restriction (IUGR) affecting care of mother, third trimester, single gestation    NOHEMI (amniotic fluid index) decreased       Start Time: 1044  Stop Time: 1210    Interpretation A  Nonstress Test Interpretation A: Reactive

## 2024-06-06 NOTE — PROGRESS NOTES
"MATERNAL FETAL MEDICINE Consult Note    Dear Dr Yousif Acevedo*:    Thank you for your kind referral of Obie Jensen.  As you know, she is a 24 y.o.   at  37  0/7 weeks gestation (Estimated Date of Delivery: 24). This is a consult.      Her antepartum course is complicated by:  FGR, normal dopplers  Fetal testicular hydrocele  Borderline Oligohydramnios    Aneuploidy Screening: low risk    HPI: Today, she denies headache, blurry vision, RUQ pain. No vaginal bleeding, no contractions.     Review of History:  Past Medical History:   Diagnosis Date    Eczema     Gonorrhea     PID (pelvic inflammatory disease)     Psoriasis     Weight loss, unintentional      Past Surgical History:   Procedure Laterality Date    FOOT SURGERY Bilateral     HAND SURGERY         Social History     Socioeconomic History    Marital status: Single   Tobacco Use    Smoking status: Former     Current packs/day: 1.00     Average packs/day: 1 pack/day for 5.0 years (5.0 ttl pk-yrs)     Types: Cigars, Cigarettes    Smokeless tobacco: Never   Vaping Use    Vaping status: Every Day    Substances: Nicotine   Substance and Sexual Activity    Alcohol use: No    Drug use: Yes     Types: Marijuana     Comment: \"occasionally to help with appetite\"    Sexual activity: Yes     Partners: Male     Birth control/protection: None, Same-sex partner     Family History   Problem Relation Age of Onset    Thyroid cancer Father     Breast cancer Neg Hx     Colon cancer Neg Hx       Allergies   Allergen Reactions    Penicillins Unknown (See Comments) and Other (See Comments)     Mother has hx of pcn allergy, patient has never had pcn  Mother has hx of pcn allergy, patient has never had pcn  Other reaction(s): Unknown (See Comments)  Mother has hx of pcn allergy, patient has never had pcn    Latex Rash      Current Outpatient Medications on File Prior to Visit   Medication Sig Dispense Refill    ondansetron ODT (ZOFRAN-ODT) 4 MG disintegrating " "tablet Take 1 tablet by mouth Every 8 (Eight) Hours As Needed for Nausea or Vomiting. 30 tablet 1    Prenatal MV & Min w/FA-DHA (Prenatal Gummies) 0.18-25 MG chewable tablet Chew 1 each Daily. 30 tablet 11     No current facility-administered medications on file prior to visit.        Past obstetric, gynecological, medical, surgical, family and social history reviewed.  Relevant lab work and imaging reviewed.    Review of systems  Constitutional:  denies fever, chills, malaise.   ENT/Mouth:  denies sore throat, tinnitus  Eyes: denies vision changes/pain  CV:  denies chest pain  Respiratory:  denies cough/SOB  GI:  denies N/V, diarrhea, abdominal pain.    :   denies dysuria  Skin:  denies lesions or pruritus   Neuro:  denies weakness, focal neurologic symptoms    Vitals:    24 0834   BP: 134/76   BP Location: Right arm   Patient Position: Sitting   Pulse: 82   Temp: 98 °F (36.7 °C)   TempSrc: Temporal   Weight: 94.3 kg (208 lb)   Height: 165.1 cm (65\")       PHYSICAL EXAM   GENERAL: Not in acute distress, AAOx3, pleasant  CARDIO: regular rate and rhythm  PULM: symmetric chest rise, speaking in complete sentences without difficulty  NEURO: awake, alert and oriented to person, place, and time  ABDOMINAL: No fundal tenderness, no rebound or guarding, gravid  EXTREMITIES: no bilateral lower extremity edema/tenderness  SKIN: Warm, well-perfused      ULTRASOUND   Please view full ultrasound note on Imaging tab in ViewPoint.  Breech presentation.  Anterior placenta.  NOHEMI 5-6 cm (2x2 pocket), consistent with borderline oligohydramnios.  BPP 8/8  Normal UA dopplers without elevated, absent, or reversed flow.  Limited visualized anatomy appears normal.    ASSESSMENT/COUNSELIN y.o.   at  37  0/7 weeks gestation (Estimated Date of Delivery: 24).     -Pregnancy  [ X ] stable  [   ] improving [  ] worsening    Diagnoses and all orders for this visit:    1. Hydrocele of testis (Primary)    2. Fetal growth " restriction antepartum      FGR, NORMAL UMBILICAL ARTERY DOPPLERS  Previously Counseled.  We discussed the potential underlying etiologies of fetal growth restriction including but not limited to fetal chromosome disorder, syndromes, congenital infection, constitutional, and placental insufficiency.  FGR is associated with increased risk of stillbirth, abnormal heart rates patterns,  delivery, oligohydramnios, and cerebral palsy. She has had genetic screening this pregnancy which was low risk and no structural abnormalities seen on ultrasound, making aneuploidy/genetic causes less likely.  I did  her that I can never rule out genetic causes by ultrasound, but that her low risk cell free DNA screening was reassuring.  Cell free DNA does not check for all genetic issues.  With congenital infections, we often see profound CNS findings such as ventriculomegaly, echogenic bowel, and even hydrops.  We do not see any of this today.       I think the most likely cause of this is placental insufficiency. Timing of delivery is guided by the results  testing of fetal well being (ultrasound BPP, FHR tracing findings) in addition to Doppler findings (umbilical artery, MCA, and ductus venosus).  Today we have normal umbilical artery dopplers with an 8/8 BPP which is reassuring.  We discussed umbilical artery doppler studies as 4 possible levels: normal, elevated, absent, and reversed.  We discussed that absent and reversed would require admission and delivery at this gestational age.   I would recommend limitation of activities, daily fetal movement assessment, and twice weekly ANFS with weekly Doppler studies.       Finally, we discussed that placental insufficiency causing fetal growth restriction can be a heralding sign of pre-eclampsia.She has no evidence of this today, but should be observed for possible development as pregnancy progresses.  We discussed in detail symptoms of pre-eclampsia.      HYDROCELE  Previously Counseled.  Almost always physiologic/normal variant.    Can be rarely associated with torsion/hernias--no evidence today.  Typically resolves at birth.  Will let NICU know for  US.  Pt informed.     BORDERLINE OLIGO (NOHEMI 5-6 cm)  We discussed today's ultrasound findings and the volume of amniotic fluid which is low normal, but not indicative of diagnosis of oligohydramnios on our scan.       As an isolated finding and in the context of reassuring measures of fetal well-being (BPP), amniotic fluid volumes in this range have not been found to be predictive of adverse  outcomes.       Low amniotic fluid volumes may be an early indicator of placental dysfunction.  This baby is growth restricted with normal UA dopplers as well.  Some cases of oligohydramnios may be an indicator of underlying fetal abnormality, syndrome, or genetic disorder.  However, in the context of a structurally normal appearing fetus, the absolute rate of clinically significant fetal abnormality is low. Certainly,  disorders such as bladder outlet obstruction, renal agenesis, or bilateral MCDK can also be the cause, as well as PPROM.  We see no evidence of  issues on US today.      I think most likely this baby's fluid hangs out at low normal (was 7-8 cm last week).       Recommend that she be admitted to L&D for observation, receive IV hydration overnight and we will repeat BPP in AM to evaluate NOHEMI again.  If NOHEMI remains low/borderline low tomorrow, would proceed with delivery.   Patient is scheduled for primary  for breech on 24    Summary of Plan  -Careful attention to fetal movement and symptoms of pre-eclampsia  -Continue to follow with OB for prenatal care  -Delivery 38 weeks unless indicated sooner  - testicle ultrasound after delivery.        Thank you for the consult and opportunity to care for this patient.  Please feel free to reach out with any questions or  concerns.      I spent 20 minutes caring for this patient on this date of service. This time includes time spent by me in the following activities: preparing for the visit, reviewing tests, obtaining and/or reviewing a separately obtained history, performing a medically appropriate examination and/or evaluation, counseling and educating the patient/family/caregiver and independently interpreting results and communicating that information with the patient/family/caregiver with greater than 50% spent in counseling and coordination of care.     LEANDRA Marroquin  Maternal Fetal Medicine-[facility]  Office: [phone]  [email or web address]

## 2024-06-06 NOTE — H&P
[facility]   HISTORY AND PHYSICAL    Patient Name:Obie Jensen  : 2000  MRN: [id number]  Primary Care Physician: Elizabeth Mcgowan APRN  Date of admission: 2024    Subjective   Subjective     Chief Complaint: sent from [facility] for decreased NOHEMI    History of Present Illness   Obie Jensen is a 24 y.o. female  @ 37w0d. Following there for IUGR and on testing today had decreased NOHEMI of 5-6 cm with one 2 cm pocket. Admitted per report for IV hydration and repeat NOHEMI in AM to see if improved.  If not better or worse, we could proceed with delivery tomorrow. Prenatal care with Dr. Guerrero - Complicated by IUGR, Breech and umbilical cord varix. Tentative plan is delivery by primary  on 24 for breech. Denies leaking fluid, bleeding, or cramping. Good fetal movement.     Review of Systems   Constitutional:  Negative for chills and fever.   Eyes:  Negative for visual disturbance.   Gastrointestinal:  Negative for abdominal pain.   Genitourinary:  Negative for dysuria, pelvic pain, vaginal bleeding and vaginal discharge.   Neurological:  Negative for headaches.   All other systems reviewed and are negative.        Personal History     Past Medical History:   Diagnosis Date    Eczema     Gonorrhea     PID (pelvic inflammatory disease)     Pregnancy 2024    Psoriasis     Weight loss, unintentional        Past Surgical History:   Procedure Laterality Date    FOOT SURGERY Bilateral     HAND SURGERY         Family History: Her family history includes Thyroid cancer in her father.     Social History: She  reports that she has quit smoking. Her smoking use included cigars and cigarettes. She has a 5 pack-year smoking history. She has never used smokeless tobacco. She reports current drug use. Drug: Marijuana. She reports that she does not drink alcohol.    Home Medications:  Prenatal Gummies and ondansetron ODT    Allergies:  She is allergic to penicillins and latex.    Objective     Objective     Vitals:    Temp:  [97.6 °F (36.4 °C)-98 °F (36.7 °C)] 97.6 °F (36.4 °C)  Heart Rate:  [73-82] 73  Resp:  [16] 16  BP: (118-134)/(67-76) 118/67    Physical Exam  Vitals reviewed. Exam conducted with a chaperone present.   Constitutional:       General: She is not in acute distress.     Appearance: She is well-developed and normal weight.   HENT:      Head: Normocephalic and atraumatic.   Eyes:      General:         Right eye: No discharge.         Left eye: No discharge.      Conjunctiva/sclera: Conjunctivae normal.   Neck:      Thyroid: No thyromegaly.   Cardiovascular:      Rate and Rhythm: Normal rate and regular rhythm.      Heart sounds: Normal heart sounds. No murmur heard.  Pulmonary:      Effort: Pulmonary effort is normal. No respiratory distress.      Breath sounds: Normal breath sounds.   Abdominal:      General: There is distension (C/w third trimester, near term).      Palpations: Abdomen is soft.      Tenderness: There is no abdominal tenderness.   Musculoskeletal:         General: No tenderness. Normal range of motion.      Cervical back: Normal range of motion and neck supple.      Right lower leg: Edema present.      Left lower leg: Edema (trace) present.   Lymphadenopathy:      Cervical: No cervical adenopathy.   Skin:     General: Skin is warm and dry.      Findings: No rash.   Neurological:      General: No focal deficit present.      Mental Status: She is alert and oriented to person, place, and time.      Deep Tendon Reflexes: Reflexes normal.   Psychiatric:         Behavior: Behavior normal.         Thought Content: Thought content normal.         Judgment: Judgment normal.          Result Review      Ultrasound today   Breech  BPP 8/8, Normal dopplers   NOHEMI 6.0 cm, MVP 3.7 cm     Growth 5/28/24  16%ile, A/C 4%ile     Lab Results   Component Value Date    GLUCOSE 87 06/06/2024    BUN 6 06/06/2024    CREATININE 0.40 (L) 06/06/2024    EGFR 140.2 06/06/2024    BCR 15.0 06/06/2024     K 3.8 2024    CO2 19.0 (L) 2024    CALCIUM 8.8 2024    ALBUMIN 3.4 (L) 2024    BILITOT 0.2 2024    AST 11 2024    ALT 8 2024     Lab Results   Component Value Date    WBC 12.24 (H) 2024    HGB 11.5 (L) 2024    HCT 34.1 2024    MCV 87.2 2024     2024     Treponemal Ab NR       Assessment & Plan   Assessment / Plan     Brief Patient Summary:  Obie Jensen is a 24 y.o. female  @ 37w0d   1) IUGR - A/C 4th %ile -  testing reassuring other than NOHEMI < 8   2) Breech presentation - longstanding.   Plan delivery by primary    3) Decreased NOHEMI  Reason for observation     Active Hospital Problems:  Active Hospital Problems    Diagnosis     **NOHEMI (amniotic fluid index) decreased     Intrauterine growth restriction (IUGR) affecting care of mother, third trimester, single gestation     Breech presentation with  problem      Plan:   IV hydration   Repeat NOHEMI in AM   If not better could be recommended by M to deliver  Delivery would be primary      DVT prophylaxis:  Mechanical DVT prophylaxis orders are present.        Alex Lorenz MD  2024  15:18 EDT

## 2024-06-06 NOTE — PROGRESS NOTES
Pt reports that she is doing well and denies vaginal bleeding, cramping, contractions or LOF at this time. Reports active fetal movement. Reviewed when to call OB office or present to L&D for evaluation with symptoms such as decreased fetal movement, vaginal bleeding, LOF or ctxs. Pt verbalized understanding. Denies visual changes or epigastric pain. Occasional HA's that go away. Denies any additional complaints at time of appointment. Next OB appointment scheduled for 06/11/2024.    Vitals:    06/06/24 0834   BP: 134/76   Pulse: 82   Temp: 98 °F (36.7 °C)

## 2024-06-06 NOTE — NON STRESS TEST
Obie Jensen, a  at 37w0d with an ARIADNA of 2024, by Last Menstrual Period, was seen at [address] for a nonstress test.    Chief Complaint   Patient presents with    Abnormal Prenatal Ultrasound     Antepartum admit for FGR & Low NOHEMI       Patient Active Problem List   Diagnosis    Female infertility    Breech presentation with  problem    Pregnancy complicated by umbilical cord varix in antepartum period    Pregnancy, supervision, high-risk, third trimester    Asymmetric IUGR affecting pregnancy, antepartum    Intrauterine growth restriction (IUGR) affecting care of mother, third trimester, single gestation    NOHEMI (amniotic fluid index) decreased       Start Time:   Stop Time:     Interpretation A  Nonstress Test Interpretation A: Reactive

## 2024-06-06 NOTE — TELEPHONE ENCOUNTER
Pt's mother is requesting to speak with you.   She says it is regarding her daughter's delivery, pt mother said they may be moving it up and she needs to discuss.     April Harley [phone]    Thank you!

## 2024-06-07 ENCOUNTER — ANESTHESIA EVENT (OUTPATIENT)
Dept: LABOR AND DELIVERY | Facility: HOSPITAL | Age: 24
End: 2024-06-07
Payer: MEDICAID

## 2024-06-07 ENCOUNTER — ANESTHESIA (OUTPATIENT)
Dept: LABOR AND DELIVERY | Facility: HOSPITAL | Age: 24
End: 2024-06-07
Payer: MEDICAID

## 2024-06-07 ENCOUNTER — APPOINTMENT (OUTPATIENT)
Dept: ULTRASOUND IMAGING | Facility: HOSPITAL | Age: 24
End: 2024-06-07
Payer: MEDICAID

## 2024-06-07 PROBLEM — Z98.891 S/P CESAREAN SECTION: Status: ACTIVE | Noted: 2024-06-07

## 2024-06-07 LAB
AMPHET+METHAMPHET UR QL: NEGATIVE
BARBITURATES UR QL SCN: NEGATIVE
BENZODIAZ UR QL SCN: NEGATIVE
CANNABINOIDS SERPL QL: POSITIVE
COCAINE UR QL: NEGATIVE
FENTANYL UR-MCNC: NEGATIVE NG/ML
METHADONE UR QL SCN: NEGATIVE
OPIATES UR QL: NEGATIVE
OXYCODONE UR QL SCN: NEGATIVE

## 2024-06-07 PROCEDURE — 76819 FETAL BIOPHYS PROFIL W/O NST: CPT

## 2024-06-07 PROCEDURE — 80307 DRUG TEST PRSMV CHEM ANLYZR: CPT | Performed by: OBSTETRICS & GYNECOLOGY

## 2024-06-07 PROCEDURE — 25010000002 KETOROLAC TROMETHAMINE PER 15 MG: Performed by: OBSTETRICS & GYNECOLOGY

## 2024-06-07 PROCEDURE — 59515 CESAREAN DELIVERY: CPT | Performed by: OBSTETRICS & GYNECOLOGY

## 2024-06-07 PROCEDURE — 76819 FETAL BIOPHYS PROFIL W/O NST: CPT | Performed by: OBSTETRICS & GYNECOLOGY

## 2024-06-07 PROCEDURE — G0480 DRUG TEST DEF 1-7 CLASSES: HCPCS | Performed by: OBSTETRICS & GYNECOLOGY

## 2024-06-07 PROCEDURE — 76820 UMBILICAL ARTERY ECHO: CPT | Performed by: OBSTETRICS & GYNECOLOGY

## 2024-06-07 PROCEDURE — 25810000003 LACTATED RINGERS PER 1000 ML: Performed by: NURSE PRACTITIONER

## 2024-06-07 PROCEDURE — 25010000002 FENTANYL CITRATE (PF) 50 MCG/ML SOLUTION: Performed by: STUDENT IN AN ORGANIZED HEALTH CARE EDUCATION/TRAINING PROGRAM

## 2024-06-07 PROCEDURE — 25010000002 ONDANSETRON PER 1 MG: Performed by: STUDENT IN AN ORGANIZED HEALTH CARE EDUCATION/TRAINING PROGRAM

## 2024-06-07 PROCEDURE — 25810000003 LACTATED RINGERS SOLUTION: Performed by: OBSTETRICS & GYNECOLOGY

## 2024-06-07 PROCEDURE — 25010000002 PHENYLEPHRINE 10 MG/ML SOLUTION 1 ML VIAL: Performed by: NURSE ANESTHETIST, CERTIFIED REGISTERED

## 2024-06-07 PROCEDURE — 25010000002 BUPIVACAINE PF 0.75 % SOLUTION: Performed by: STUDENT IN AN ORGANIZED HEALTH CARE EDUCATION/TRAINING PROGRAM

## 2024-06-07 PROCEDURE — 59025 FETAL NON-STRESS TEST: CPT | Performed by: OBSTETRICS & GYNECOLOGY

## 2024-06-07 PROCEDURE — 25010000002 MORPHINE PER 10 MG: Performed by: STUDENT IN AN ORGANIZED HEALTH CARE EDUCATION/TRAINING PROGRAM

## 2024-06-07 PROCEDURE — 59025 FETAL NON-STRESS TEST: CPT

## 2024-06-07 PROCEDURE — 25010000002 CEFAZOLIN PER 500 MG: Performed by: OBSTETRICS & GYNECOLOGY

## 2024-06-07 PROCEDURE — 88307 TISSUE EXAM BY PATHOLOGIST: CPT

## 2024-06-07 PROCEDURE — 76820 UMBILICAL ARTERY ECHO: CPT

## 2024-06-07 PROCEDURE — 25010000002 KETOROLAC TROMETHAMINE PER 15 MG: Performed by: NURSE ANESTHETIST, CERTIFIED REGISTERED

## 2024-06-07 RX ORDER — MORPHINE SULFATE 2 MG/ML
2 INJECTION, SOLUTION INTRAMUSCULAR; INTRAVENOUS
Status: DISCONTINUED | OUTPATIENT
Start: 2024-06-07 | End: 2024-06-07

## 2024-06-07 RX ORDER — DIPHENHYDRAMINE HCL 25 MG
25 CAPSULE ORAL EVERY 4 HOURS PRN
Status: DISCONTINUED | OUTPATIENT
Start: 2024-06-07 | End: 2024-06-07

## 2024-06-07 RX ORDER — MISOPROSTOL 200 UG/1
800 TABLET ORAL AS NEEDED
Status: DISCONTINUED | OUTPATIENT
Start: 2024-06-07 | End: 2024-06-07

## 2024-06-07 RX ORDER — CITRIC ACID/SODIUM CITRATE 334-500MG
30 SOLUTION, ORAL ORAL ONCE
Status: COMPLETED | OUTPATIENT
Start: 2024-06-07 | End: 2024-06-07

## 2024-06-07 RX ORDER — ACETAMINOPHEN 500 MG
1000 TABLET ORAL ONCE
Status: COMPLETED | OUTPATIENT
Start: 2024-06-07 | End: 2024-06-07

## 2024-06-07 RX ORDER — ONDANSETRON 2 MG/ML
4 INJECTION INTRAMUSCULAR; INTRAVENOUS ONCE AS NEEDED
Status: DISCONTINUED | OUTPATIENT
Start: 2024-06-07 | End: 2024-06-07

## 2024-06-07 RX ORDER — KETOROLAC TROMETHAMINE 30 MG/ML
30 INJECTION, SOLUTION INTRAMUSCULAR; INTRAVENOUS ONCE
Status: COMPLETED | OUTPATIENT
Start: 2024-06-07 | End: 2024-06-07

## 2024-06-07 RX ORDER — CARBOPROST TROMETHAMINE 250 UG/ML
250 INJECTION, SOLUTION INTRAMUSCULAR AS NEEDED
Status: DISCONTINUED | OUTPATIENT
Start: 2024-06-07 | End: 2024-06-07

## 2024-06-07 RX ORDER — SODIUM CHLORIDE 0.9 % (FLUSH) 0.9 %
10 SYRINGE (ML) INJECTION EVERY 12 HOURS SCHEDULED
Status: DISCONTINUED | OUTPATIENT
Start: 2024-06-07 | End: 2024-06-07

## 2024-06-07 RX ORDER — FENTANYL CITRATE 50 UG/ML
INJECTION, SOLUTION INTRAMUSCULAR; INTRAVENOUS
Status: COMPLETED | OUTPATIENT
Start: 2024-06-07 | End: 2024-06-07

## 2024-06-07 RX ORDER — SIMETHICONE 80 MG
80 TABLET,CHEWABLE ORAL 4 TIMES DAILY PRN
Status: DISCONTINUED | OUTPATIENT
Start: 2024-06-07 | End: 2024-06-09 | Stop reason: HOSPADM

## 2024-06-07 RX ORDER — DIPHENHYDRAMINE HYDROCHLORIDE 50 MG/ML
25 INJECTION INTRAMUSCULAR; INTRAVENOUS EVERY 4 HOURS PRN
Status: DISCONTINUED | OUTPATIENT
Start: 2024-06-07 | End: 2024-06-07

## 2024-06-07 RX ORDER — HYDROXYZINE 50 MG/1
50 TABLET, FILM COATED ORAL EVERY 6 HOURS PRN
Status: DISCONTINUED | OUTPATIENT
Start: 2024-06-07 | End: 2024-06-09 | Stop reason: HOSPADM

## 2024-06-07 RX ORDER — PROMETHAZINE HYDROCHLORIDE 12.5 MG/1
12.5 SUPPOSITORY RECTAL EVERY 6 HOURS PRN
Status: DISCONTINUED | OUTPATIENT
Start: 2024-06-07 | End: 2024-06-09 | Stop reason: HOSPADM

## 2024-06-07 RX ORDER — PRENATAL VIT/IRON FUM/FOLIC AC 27MG-0.8MG
1 TABLET ORAL DAILY
Status: DISCONTINUED | OUTPATIENT
Start: 2024-06-07 | End: 2024-06-09 | Stop reason: HOSPADM

## 2024-06-07 RX ORDER — BUPIVACAINE HYDROCHLORIDE 7.5 MG/ML
INJECTION, SOLUTION EPIDURAL; RETROBULBAR
Status: COMPLETED | OUTPATIENT
Start: 2024-06-07 | End: 2024-06-07

## 2024-06-07 RX ORDER — HYDROCORTISONE 25 MG/G
CREAM TOPICAL 3 TIMES DAILY PRN
Status: DISCONTINUED | OUTPATIENT
Start: 2024-06-07 | End: 2024-06-09 | Stop reason: HOSPADM

## 2024-06-07 RX ORDER — PROMETHAZINE HYDROCHLORIDE 25 MG/1
25 TABLET ORAL EVERY 6 HOURS PRN
Status: DISCONTINUED | OUTPATIENT
Start: 2024-06-07 | End: 2024-06-09 | Stop reason: HOSPADM

## 2024-06-07 RX ORDER — SODIUM CHLORIDE 0.9 % (FLUSH) 0.9 %
10 SYRINGE (ML) INJECTION AS NEEDED
Status: DISCONTINUED | OUTPATIENT
Start: 2024-06-07 | End: 2024-06-07

## 2024-06-07 RX ORDER — ONDANSETRON 2 MG/ML
4 INJECTION INTRAMUSCULAR; INTRAVENOUS ONCE AS NEEDED
Status: COMPLETED | OUTPATIENT
Start: 2024-06-07 | End: 2024-06-07

## 2024-06-07 RX ORDER — EPHEDRINE SULFATE 50 MG/ML
INJECTION INTRAVENOUS AS NEEDED
Status: DISCONTINUED | OUTPATIENT
Start: 2024-06-07 | End: 2024-06-07 | Stop reason: SURG

## 2024-06-07 RX ORDER — OXYTOCIN/0.9 % SODIUM CHLORIDE 30/500 ML
125 PLASTIC BAG, INJECTION (ML) INTRAVENOUS CONTINUOUS PRN
Status: DISCONTINUED | OUTPATIENT
Start: 2024-06-07 | End: 2024-06-09 | Stop reason: HOSPADM

## 2024-06-07 RX ORDER — ACETAMINOPHEN 325 MG/1
650 TABLET ORAL EVERY 6 HOURS
Status: DISCONTINUED | OUTPATIENT
Start: 2024-06-08 | End: 2024-06-09 | Stop reason: HOSPADM

## 2024-06-07 RX ORDER — OXYTOCIN/0.9 % SODIUM CHLORIDE 30/500 ML
250 PLASTIC BAG, INJECTION (ML) INTRAVENOUS CONTINUOUS
Status: ACTIVE | OUTPATIENT
Start: 2024-06-07 | End: 2024-06-07

## 2024-06-07 RX ORDER — SODIUM CHLORIDE, SODIUM LACTATE, POTASSIUM CHLORIDE, CALCIUM CHLORIDE 600; 310; 30; 20 MG/100ML; MG/100ML; MG/100ML; MG/100ML
125 INJECTION, SOLUTION INTRAVENOUS CONTINUOUS
Status: DISCONTINUED | OUTPATIENT
Start: 2024-06-07 | End: 2024-06-07

## 2024-06-07 RX ORDER — HYDROMORPHONE HYDROCHLORIDE 1 MG/ML
0.5 INJECTION, SOLUTION INTRAMUSCULAR; INTRAVENOUS; SUBCUTANEOUS
Status: DISCONTINUED | OUTPATIENT
Start: 2024-06-07 | End: 2024-06-07

## 2024-06-07 RX ORDER — OXYCODONE HYDROCHLORIDE 5 MG/1
5 TABLET ORAL EVERY 4 HOURS PRN
Status: DISCONTINUED | OUTPATIENT
Start: 2024-06-07 | End: 2024-06-09 | Stop reason: HOSPADM

## 2024-06-07 RX ORDER — DROPERIDOL 2.5 MG/ML
0.62 INJECTION, SOLUTION INTRAMUSCULAR; INTRAVENOUS
Status: DISCONTINUED | OUTPATIENT
Start: 2024-06-07 | End: 2024-06-07

## 2024-06-07 RX ORDER — ACETAMINOPHEN 500 MG
1000 TABLET ORAL EVERY 6 HOURS
Status: COMPLETED | OUTPATIENT
Start: 2024-06-07 | End: 2024-06-08

## 2024-06-07 RX ORDER — ALUMINA, MAGNESIA, AND SIMETHICONE 2400; 2400; 240 MG/30ML; MG/30ML; MG/30ML
15 SUSPENSION ORAL EVERY 4 HOURS PRN
Status: DISCONTINUED | OUTPATIENT
Start: 2024-06-07 | End: 2024-06-09 | Stop reason: HOSPADM

## 2024-06-07 RX ORDER — HYDROXYZINE 50 MG/1
50 TABLET, FILM COATED ORAL NIGHTLY PRN
Status: DISCONTINUED | OUTPATIENT
Start: 2024-06-07 | End: 2024-06-09 | Stop reason: HOSPADM

## 2024-06-07 RX ORDER — KETOROLAC TROMETHAMINE 30 MG/ML
INJECTION, SOLUTION INTRAMUSCULAR; INTRAVENOUS AS NEEDED
Status: DISCONTINUED | OUTPATIENT
Start: 2024-06-07 | End: 2024-06-07 | Stop reason: SURG

## 2024-06-07 RX ORDER — METHYLERGONOVINE MALEATE 0.2 MG/ML
200 INJECTION INTRAVENOUS AS NEEDED
Status: DISCONTINUED | OUTPATIENT
Start: 2024-06-07 | End: 2024-06-07

## 2024-06-07 RX ORDER — TRANEXAMIC ACID 10 MG/ML
1000 INJECTION, SOLUTION INTRAVENOUS ONCE AS NEEDED
Status: DISCONTINUED | OUTPATIENT
Start: 2024-06-07 | End: 2024-06-07

## 2024-06-07 RX ORDER — IBUPROFEN 600 MG/1
600 TABLET ORAL EVERY 6 HOURS
Status: DISCONTINUED | OUTPATIENT
Start: 2024-06-08 | End: 2024-06-09 | Stop reason: HOSPADM

## 2024-06-07 RX ORDER — ONDANSETRON 4 MG/1
4 TABLET, ORALLY DISINTEGRATING ORAL EVERY 6 HOURS PRN
Status: DISCONTINUED | OUTPATIENT
Start: 2024-06-07 | End: 2024-06-07

## 2024-06-07 RX ORDER — KETOROLAC TROMETHAMINE 15 MG/ML
15 INJECTION, SOLUTION INTRAMUSCULAR; INTRAVENOUS EVERY 6 HOURS
Status: COMPLETED | OUTPATIENT
Start: 2024-06-07 | End: 2024-06-08

## 2024-06-07 RX ORDER — NALOXONE HCL 0.4 MG/ML
0.2 VIAL (ML) INJECTION
Status: DISCONTINUED | OUTPATIENT
Start: 2024-06-07 | End: 2024-06-07

## 2024-06-07 RX ORDER — DOCUSATE SODIUM 100 MG/1
100 CAPSULE, LIQUID FILLED ORAL 2 TIMES DAILY PRN
Status: DISCONTINUED | OUTPATIENT
Start: 2024-06-07 | End: 2024-06-09 | Stop reason: HOSPADM

## 2024-06-07 RX ORDER — FAMOTIDINE 10 MG/ML
20 INJECTION, SOLUTION INTRAVENOUS ONCE AS NEEDED
Status: COMPLETED | OUTPATIENT
Start: 2024-06-07 | End: 2024-06-07

## 2024-06-07 RX ORDER — MORPHINE SULFATE 4 MG/ML
INJECTION, SOLUTION INTRAMUSCULAR; INTRAVENOUS
Status: COMPLETED | OUTPATIENT
Start: 2024-06-07 | End: 2024-06-07

## 2024-06-07 RX ORDER — OXYTOCIN/0.9 % SODIUM CHLORIDE 30/500 ML
999 PLASTIC BAG, INJECTION (ML) INTRAVENOUS ONCE
Status: COMPLETED | OUTPATIENT
Start: 2024-06-07 | End: 2024-06-07

## 2024-06-07 RX ORDER — OXYCODONE HYDROCHLORIDE 10 MG/1
10 TABLET ORAL EVERY 4 HOURS PRN
Status: DISCONTINUED | OUTPATIENT
Start: 2024-06-07 | End: 2024-06-09 | Stop reason: HOSPADM

## 2024-06-07 RX ORDER — LIDOCAINE HYDROCHLORIDE 10 MG/ML
0.5 INJECTION, SOLUTION INFILTRATION; PERINEURAL ONCE AS NEEDED
Status: DISCONTINUED | OUTPATIENT
Start: 2024-06-07 | End: 2024-06-07

## 2024-06-07 RX ORDER — ONDANSETRON 2 MG/ML
4 INJECTION INTRAMUSCULAR; INTRAVENOUS EVERY 6 HOURS PRN
Status: DISCONTINUED | OUTPATIENT
Start: 2024-06-07 | End: 2024-06-09 | Stop reason: HOSPADM

## 2024-06-07 RX ORDER — ONDANSETRON 2 MG/ML
4 INJECTION INTRAMUSCULAR; INTRAVENOUS EVERY 6 HOURS PRN
Status: DISCONTINUED | OUTPATIENT
Start: 2024-06-07 | End: 2024-06-07

## 2024-06-07 RX ORDER — CALCIUM CARBONATE 500 MG/1
1 TABLET, CHEWABLE ORAL EVERY 4 HOURS PRN
Status: DISCONTINUED | OUTPATIENT
Start: 2024-06-07 | End: 2024-06-09 | Stop reason: HOSPADM

## 2024-06-07 RX ADMIN — FENTANYL CITRATE 20 MCG: 50 INJECTION, SOLUTION INTRAMUSCULAR; INTRAVENOUS at 12:23

## 2024-06-07 RX ADMIN — SODIUM CHLORIDE, POTASSIUM CHLORIDE, SODIUM LACTATE AND CALCIUM CHLORIDE 1000 ML: 600; 310; 30; 20 INJECTION, SOLUTION INTRAVENOUS at 12:04

## 2024-06-07 RX ADMIN — EPHEDRINE SULFATE 2.5 MG: 50 INJECTION INTRAVENOUS at 12:45

## 2024-06-07 RX ADMIN — MORPHINE SULFATE 150 MCG: 4 INJECTION, SOLUTION INTRAMUSCULAR; INTRAVENOUS at 12:23

## 2024-06-07 RX ADMIN — PHENYLEPHRINE HYDROCHLORIDE 20 MCG/MIN: 10 INJECTION INTRAVENOUS at 12:27

## 2024-06-07 RX ADMIN — SODIUM CHLORIDE, POTASSIUM CHLORIDE, SODIUM LACTATE AND CALCIUM CHLORIDE 125 ML/HR: 600; 310; 30; 20 INJECTION, SOLUTION INTRAVENOUS at 00:36

## 2024-06-07 RX ADMIN — ACETAMINOPHEN 1000 MG: 500 TABLET ORAL at 11:59

## 2024-06-07 RX ADMIN — ACETAMINOPHEN 1000 MG: 500 TABLET ORAL at 23:53

## 2024-06-07 RX ADMIN — FAMOTIDINE 20 MG: 10 INJECTION INTRAVENOUS at 11:57

## 2024-06-07 RX ADMIN — Medication 999 ML/HR: at 12:41

## 2024-06-07 RX ADMIN — EPHEDRINE SULFATE 2.5 MG: 50 INJECTION INTRAVENOUS at 12:58

## 2024-06-07 RX ADMIN — Medication 250 ML/HR: at 13:34

## 2024-06-07 RX ADMIN — KETOROLAC TROMETHAMINE 30 MG: 30 INJECTION, SOLUTION INTRAMUSCULAR at 13:36

## 2024-06-07 RX ADMIN — EPHEDRINE SULFATE 2.5 MG: 50 INJECTION INTRAVENOUS at 12:38

## 2024-06-07 RX ADMIN — EPHEDRINE SULFATE 5 MG: 50 INJECTION INTRAVENOUS at 12:26

## 2024-06-07 RX ADMIN — CEFAZOLIN 2 G: 2 INJECTION, POWDER, FOR SOLUTION INTRAVENOUS at 12:05

## 2024-06-07 RX ADMIN — PHENYLEPHRINE HYDROCHLORIDE 100 MCG: 10 INJECTION INTRAVENOUS at 12:23

## 2024-06-07 RX ADMIN — SODIUM CHLORIDE, POTASSIUM CHLORIDE, SODIUM LACTATE AND CALCIUM CHLORIDE 125 ML/HR: 600; 310; 30; 20 INJECTION, SOLUTION INTRAVENOUS at 08:45

## 2024-06-07 RX ADMIN — Medication 125 ML/HR: at 14:53

## 2024-06-07 RX ADMIN — KETOROLAC TROMETHAMINE 15 MG: 15 INJECTION, SOLUTION INTRAMUSCULAR; INTRAVENOUS at 21:23

## 2024-06-07 RX ADMIN — ONDANSETRON 4 MG: 2 INJECTION INTRAMUSCULAR; INTRAVENOUS at 11:58

## 2024-06-07 RX ADMIN — BUPIVACAINE HYDROCHLORIDE 1.6 ML: 7.5 INJECTION, SOLUTION EPIDURAL; RETROBULBAR at 12:23

## 2024-06-07 RX ADMIN — SODIUM CHLORIDE, POTASSIUM CHLORIDE, SODIUM LACTATE AND CALCIUM CHLORIDE 125 ML/HR: 600; 310; 30; 20 INJECTION, SOLUTION INTRAVENOUS at 14:02

## 2024-06-07 RX ADMIN — ACETAMINOPHEN 1000 MG: 500 TABLET ORAL at 17:27

## 2024-06-07 RX ADMIN — Medication 10 ML: at 13:36

## 2024-06-07 RX ADMIN — KETOROLAC TROMETHAMINE 30 MG: 30 INJECTION, SOLUTION INTRAMUSCULAR; INTRAVENOUS at 12:56

## 2024-06-07 RX ADMIN — SODIUM CITRATE AND CITRIC ACID MONOHYDRATE 30 ML: 334; 500 SOLUTION ORAL at 11:58

## 2024-06-07 RX ADMIN — OXYCODONE HYDROCHLORIDE 5 MG: 5 TABLET ORAL at 16:18

## 2024-06-07 RX ADMIN — Medication 10 ML: at 09:00

## 2024-06-07 NOTE — NON STRESS TEST
Obie Jensen, a  at 37w1d with an ARIADNA of 2024, by Last Menstrual Period, was seen at [address] for a nonstress test.    Chief Complaint   Patient presents with    Abnormal Prenatal Ultrasound     Antepartum admit for FGR & Low NOHEMI       Patient Active Problem List   Diagnosis    Female infertility    Breech presentation with  problem    Pregnancy complicated by umbilical cord varix in antepartum period    Pregnancy, supervision, high-risk, third trimester    Asymmetric IUGR affecting pregnancy, antepartum    Intrauterine growth restriction (IUGR) affecting care of mother, third trimester, single gestation    NOHEMI (amniotic fluid index) decreased       Start Time: 742  Stop Time: 842    Interpretation A  Nonstress Test Interpretation A: Reactive

## 2024-06-07 NOTE — ANESTHESIA PREPROCEDURE EVALUATION
Anesthesia Evaluation     Patient summary reviewed and Nursing notes reviewed   NPO Solid Status: > 8 hours  NPO Liquid Status: > 2 hours           Airway   Mallampati: II  TM distance: >3 FB  Neck ROM: full  Dental      Pulmonary - negative pulmonary ROS   Cardiovascular - negative cardio ROS        Neuro/Psych- negative ROS  GI/Hepatic/Renal/Endo - negative ROS     Musculoskeletal (-) negative ROS    Abdominal    Substance History - negative use     OB/GYN    (+) Pregnant        Other - negative ROS                   Anesthesia Plan    ASA 2     spinal     (37w1d)    Anesthetic plan, risks, benefits, and alternatives have been provided, discussed and informed consent has been obtained with: patient.    CODE STATUS:    Level Of Support Discussed With: Patient  Code Status (Patient has no pulse and is not breathing): CPR (Attempt to Resuscitate)  Medical Interventions (Patient has pulse or is breathing): Full Support

## 2024-06-07 NOTE — PROGRESS NOTES
Indication for    Indication: 24 y.o.  @ 37w1d  Admitted yesterday with decreased NOHEMI, known IUGR and breech. IV hydration overnight to see if would improve and it did not with NOHEMI this morning of 5.5 cm.  Given that issue delivery has been recommended by M. With plan to do Primary  for breech. R/B/A reviewed with patient in detail along with discussion of findings and plan. All questions answered, voiced understanding and wishes to proceed.   Diagnoses:   1. Pregnancy at 37w1d   2. Breech presentation   3. IUGR   4. Decreased NOHEMI   Planned Procedures:  1. , low transverse   Planned anesthesia: Spinal   Pre-Op H/H: 11.5/34.1   Placental Location: anterior   Pre-Op Antibiotics: Kefzol   Consent: signed and on the chart   Plan: To OR for planned procedure, all questions/concerns addressed with patient and family      Alex Lorenz MD   2024  10:49 EDT

## 2024-06-07 NOTE — PLAN OF CARE
Goal Outcome Evaluation:  Plan of Care Reviewed With: patient        Progress: no change  Outcome Evaluation: RNST, +FM, VSS. Denied any vaginal bleeding, leaking of fluid, or regular and painful contractions. Abdomen palpated soft. LR infusing at 125cc/hr. Patient took a shower just before this shift. BPP ordered for this am. IF NOHEMI remains low or lower, possible delivery. Baby is breech and has a testicular hydrocele. NICU to plan for  US. Deliver by c/s. Dopplers were normal with last BPP. Patient has been NPO since midnight.

## 2024-06-07 NOTE — ANESTHESIA POSTPROCEDURE EVALUATION
Patient: Obie Jensen    Procedure Summary       Date: 24 Room / Location:  [facility]    Anesthesia Start: 1213 Anesthesia Stop: 131    Procedure:  SECTION PRIMARY (Abdomen) Diagnosis:       Breech presentation with  problem, single or unspecified fetus      Intrauterine growth restriction (IUGR) affecting care of mother, third trimester, single gestation      NOHEMI (amniotic fluid index) decreased      (Breech presentation with  problem, single or unspecified fetus [O32.1XX0])    Surgeons: Alex Lorenz MD Provider: Jacek Carlos MD    Anesthesia Type: spinal ASA Status: 2            Anesthesia Type: spinal    Vitals  Vitals Value Taken Time   /69 24 1515   Temp 36.4 °C (97.6 °F) 24 1334   Pulse 66 24 1519   Resp 16 24 1500   SpO2 100 % 24 1519   Vitals shown include unfiled device data.        Post Anesthesia Care and Evaluation    Level of consciousness: awake and alert  Pain management: adequate    Airway patency: patent  Anesthetic complications: No anesthetic complications  PONV Status: controlled  Cardiovascular status: blood pressure returned to baseline and acceptable  Respiratory status: acceptable  Hydration status: acceptable

## 2024-06-07 NOTE — OP NOTE
Section Full Procedure Note    Indications: malpresentation: breech    Pre-operative Diagnosis: 1) Pregnancy at 37w1d             2) Breech presentation                                              3) IUGR                                              4) Decreased NOHEMI    Post-operative Diagnosis: same    Surgeon: Alex Lorenz MD     Assistants: MD Dr. Tramaine Kinsey assisted me in performing this , she was instrumental in the delivery and with the operation, both with physical assistance completing the steps of the operation from their side of the table as well in assistance in clinical judgement during to procedure.       Anesthesia: Spinal anesthesia    ASA Class:  per anesthesia          Procedure Details   The patient was seen in the Holding Room. The risks, benefits, complications, treatment options, and expected outcomes were discussed with the patient.  The patient concurred with the proposed plan, giving informed consent.  The site of surgery properly noted/marked. The patient was taken to Operating Room # 3, identified as Obie Jensen and the procedure verified as  Delivery. A Time Out was held and the above information confirmed.    After induction of anesthesia, the patient was draped and prepped in the usual sterile manner. A Pfannenstiel incision was made and carried down through the subcutaneous tissue to the fascia. Fascial incision was made and extended transversely. The fascia was  from the underlying rectus tissue superiorly and inferiorly. The peritoneum was identified and entered. Peritoneal incision was extended longitudinally.  A low transverse uterine incision was made. Delivered from breech presentation, using typical maneuvers was a 5#13oz, Male with Apgar scores of 8 at one minute and 9 at five minutes. Nuchal cord was not noted.  After the umbilical cord was clamped and cut cord blood was obtained for evaluation. The placenta was removed  intact and appeared normal. The uterine outline, tubes and ovaries appeared normal. The uterine incision was closed with running locked sutures of 0 Monocryl. A second layer of 0 Monocryl was used in an imbricating fashion.  Hemostasis was observed. Lavage was carried out until clear. The peritoneum was closed with 3-0 Monocryl in a running fashion. The fascia was then reapproximated with running sutures of 0 Vicryl. The skin was reapproximated with 4-0 Vicryl in a subcuticular stitch.     Instrument, sponge, and needle counts were correct prior the abdominal closure and at the conclusion of the case.     Findings:  Normal pelvic anatomy     Quantitative Blood Loss:  639 cc            Drains: Singh                  Specimens: placenta for IUGR                 Complications:  None; patient tolerated the procedure well.           Disposition: PACU - hemodynamically stable.           Condition: stable    Alex Lorenz MD  6/7/2024  14:42 EDT

## 2024-06-07 NOTE — ANESTHESIA PROCEDURE NOTES
Spinal Block    Pre-sedation assessment completed: 6/7/2024 12:18 PM    Patient reassessed immediately prior to procedure    Patient location during procedure: OR  Start Time: 6/7/2024 12:18 PM  Stop Time: 6/7/2024 12:23 PM  Indication:at surgeon's request  Performed By  CRNA/CAA: Nakul Mendez CRNA  Preanesthetic Checklist  Completed: patient identified, IV checked, site marked, risks and benefits discussed, surgical consent, monitors and equipment checked, pre-op evaluation and timeout performed  Spinal Block Prep:  Patient Position:sitting  Sterile Tech:cap, gloves, mask and sterile barriers  Prep:Chloraprep  Patient Monitoring:blood pressure monitoring, continuous pulse oximetry and EKG    Spinal Block Procedure  Approach:midline  Guidance:landmark technique  Location:L4-L5  Needle Type:[place]  Needle Gauge:25 G  Placement of Spinal needle event:cerebrospinal fluid aspirated  Paresthesia: no  Fluid Appearance:clear  Medications: Morphine sulfate (PF) injection - Spinal   150 mcg - 6/7/2024 12:23:00 PM  bupivacaine PF (MARCAINE) 0.75 % injection - Spinal   1.6 mL - 6/7/2024 12:23:00 PM  fentaNYL citrate (PF) (SUBLIMAZE) injection - Intrathecal   20 mcg - 6/7/2024 12:23:00 PM   Post Assessment  Patient Tolerance:patient tolerated the procedure well with no apparent complications  Complications no

## 2024-06-07 NOTE — PLAN OF CARE
Goal Outcome Evaluation:           Progress: improving  Outcome Evaluation: Pt s/p PCS for IUGR, Oligo, and Breech. Pt delivered 1 male infant. Pt stable. Infant stable. Bleeding and pain controlled at this time. VSS. Afebrile. Pt formula feeding. Infant BG wnl. Pt to be transferred to [facility].

## 2024-06-07 NOTE — NON STRESS TEST
Obie Jensen, a  at 37w1d with an ARIADNA of 2024, by Last Menstrual Period, was seen at [address] for a nonstress test.    Chief Complaint   Patient presents with    Abnormal Prenatal Ultrasound     Antepartum admit for FGR & Low NOHEMI       Patient Active Problem List   Diagnosis    Female infertility    Breech presentation with  problem    Pregnancy complicated by umbilical cord varix in antepartum period    Pregnancy, supervision, high-risk, third trimester    Asymmetric IUGR affecting pregnancy, antepartum    Intrauterine growth restriction (IUGR) affecting care of mother, third trimester, single gestation    NOHEMI (amniotic fluid index) decreased       Start Time: 2300  Stop Time: 0000    Interpretation A  Nonstress Test Interpretation A: Reactive      REACTIVE           "INPATIENT PSYCHIATRIC PROGRESS NOTE    Name:  Flo Valderrama  :  1960  MRN:  [id number]  Visit Number:  [id number]  Length of stay:  2    SUBJECTIVE  CC/Focus of Exam: alcohol use    INTERVAL HISTORY:  The patient reports he is feeling anxious and tremulous. States he is experiencing withdrawals from alcohol and it is gradually improving.  Depression rating 7/10  Anxiety rating 8/10  Sleep: good  Withdrawal sx: tremors, anxiety, restlessness  Cravin/10    Review of Systems   Constitutional: Positive for fatigue.   Respiratory: Negative.    Cardiovascular: Negative.    Neurological: Positive for tremors and weakness.   Psychiatric/Behavioral: Positive for dysphoric mood. The patient is nervous/anxious.        OBJECTIVE    Temp:  [97.9 °F (36.6 °C)-99.2 °F (37.3 °C)] 97.9 °F (36.6 °C)  Heart Rate:  [] 105  Resp:  [18] 18  BP: ()/(58-77) 122/75    MENTAL STATUS EXAM:  Appearance:Casually dressed, good hygeine.   Cooperation:Cooperative  Psychomotor: No psychomotor agitation/retardation, No EPS, No motor tics  Speech-normal rate, amount.  Mood \"depressed and anxious\"   Affect-congruent, appropriate, stable  Thought Content-goal directed, no delusional material present  Thought process-linear, organized.  Suicidality: No SI  Homicidality: No HI  Perception: No AH/VH  Insight-fair   Judgement-fair    Lab Results (last 24 hours)     ** No results found for the last 24 hours. **             Imaging Results (Last 24 Hours)     ** No results found for the last 24 hours. **             ECG/EMG Results (most recent)     Procedure Component Value Units Date/Time    ECG 12 Lead [[id number]] Collected: 10/06/20 1727     Updated: 10/07/20 1931    Narrative:      Test Reason : Baseline Cardiac Status  Blood Pressure : **/** mmHG  Vent. Rate : 106 BPM     Atrial Rate : 106 BPM     P-R Int : 144 ms          QRS Dur : 086 ms      QT Int : 330 ms       P-R-T Axes : 068 065 074 degrees     QTc Int : 438 ms    Sinus " tachycardia  Otherwise normal ECG  When compared with ECG of 2020 08:19,  Questionable change in QRS axis  Confirmed by Ken Almodovar () on 10/7/2020 7:31:15 PM    Referred By:             Confirmed By:Ken Almodovar           ALLERGIES: Acetaminophen      Current Facility-Administered Medications:   •  aluminum-magnesium hydroxide-simethicone (MAALOX MAX) 400-400-40 MG/5ML suspension 15 mL, 15 mL, Oral, Q6H PRN, Piyush Segal MD  •  benzonatate (TESSALON) capsule 100 mg, 100 mg, Oral, TID PRN, Piyush Segal MD  •  benztropine (COGENTIN) tablet 2 mg, 2 mg, Oral, Once PRN **OR** benztropine (COGENTIN) injection 1 mg, 1 mg, Intramuscular, Once PRN, Piyush Segal MD  •  [] cloNIDine (CATAPRES) tablet 0.1 mg, 0.1 mg, Oral, 4x Daily PRN **FOLLOWED BY** cloNIDine (CATAPRES) tablet 0.1 mg, 0.1 mg, Oral, TID PRN **FOLLOWED BY** [START ON 10/9/2020] cloNIDine (CATAPRES) tablet 0.1 mg, 0.1 mg, Oral, BID PRN **FOLLOWED BY** [START ON 10/10/2020] cloNIDine (CATAPRES) tablet 0.1 mg, 0.1 mg, Oral, Daily PRN, Piyush Segal MD  •  cyclobenzaprine (FLEXERIL) tablet 10 mg, 10 mg, Oral, TID PRN, Piyush Segal MD  •  dicyclomine (BENTYL) capsule 10 mg, 10 mg, Oral, TID PRN, Piyush Segal MD  •  famotidine (PEPCID) tablet 20 mg, 20 mg, Oral, BID PRN, Piyush Segal MD  •  hydrOXYzine (ATARAX) tablet 50 mg, 50 mg, Oral, Q6H PRN, Piyush Segal MD  •  ibuprofen (ADVIL,MOTRIN) tablet 400 mg, 400 mg, Oral, Q6H PRN, Piyush Segal MD, 400 mg at 10/08/20 0813  •  lisinopril (PRINIVIL,ZESTRIL) tablet 10 mg, 10 mg, Oral, Daily, Gena Porter MD, 10 mg at 10/08/20 0813  •  loperamide (IMODIUM) capsule 2 mg, 2 mg, Oral, Q2H PRN, Piyush Segal MD, 2 mg at 10/07/20 0940  •  [COMPLETED] LORazepam (ATIVAN) tablet 2 mg, 2 mg, Oral, 3 times per day, 2 mg at 10/07/20 2116 **FOLLOWED BY** LORazepam (ATIVAN) tablet 1.5 mg, 1.5 mg, Oral, 3 times per day, 1.5 mg at 10/08/20 0814 **FOLLOWED BY**  [START ON 10/9/2020] LORazepam (ATIVAN) tablet 1 mg, 1 mg, Oral, 3 times per day **FOLLOWED BY** [START ON 10/10/2020] LORazepam (ATIVAN) tablet 0.5 mg, 0.5 mg, Oral, 3 times per day, Piyush Segal MD  •  [] LORazepam (ATIVAN) tablet 2 mg, 2 mg, Oral, Q4H PRN **FOLLOWED BY** LORazepam (ATIVAN) tablet 1.5 mg, 1.5 mg, Oral, Q4H PRN **FOLLOWED BY** [START ON 10/9/2020] LORazepam (ATIVAN) tablet 1 mg, 1 mg, Oral, Q4H PRN **FOLLOWED BY** [START ON 10/10/2020] LORazepam (ATIVAN) tablet 0.5 mg, 0.5 mg, Oral, Q4H PRN, Piyush Segal MD  •  magnesium hydroxide (MILK OF MAGNESIA) suspension 2400 mg/10mL 10 mL, 10 mL, Oral, Daily PRN, Piyush Segal MD  •  multivitamin (DAILY KARINE) tablet 1 tablet, 1 tablet, Oral, Daily, Gena Porter MD, 1 tablet at 10/08/20 0813  •  ondansetron (ZOFRAN) tablet 4 mg, 4 mg, Oral, Q6H PRN, Piyush Segal MD  •  sodium chloride nasal spray 2 spray, 2 spray, Each Nare, PRN, Piyush Segal MD  •  traZODone (DESYREL) tablet 50 mg, 50 mg, Oral, Nightly PRN, Piyush Segal MD    ASSESSMENT & PLAN:      Alcohol use disorder, severe, dependence (CMS/HCC)  - Continue Ativan detox  - Thiamine and folate      HTN (hypertension)  - Lisinopril    Special precautions: Special Precautions Level 4 (q30 min checks).    Behavioral Health Treatment Plan and Problem List: I have reviewed and approved the Behavioral Health Treatment Plan and Problem list.  The patient has had a chance to review and agrees with the treatment plan.     Clinician:  Gena Porter MD  10/08/20  09:27 EDT

## 2024-06-08 LAB
BASOPHILS # BLD AUTO: 0.06 10*3/MM3 (ref 0–0.2)
BASOPHILS NFR BLD AUTO: 0.5 % (ref 0–1.5)
CLINDAMYCIN ISLT KB: ABNORMAL
DEPRECATED RDW RBC AUTO: 40.7 FL (ref 37–54)
EOSINOPHIL # BLD AUTO: 0.12 10*3/MM3 (ref 0–0.4)
EOSINOPHIL NFR BLD AUTO: 1 % (ref 0.3–6.2)
ERYTHROCYTE [DISTWIDTH] IN BLOOD BY AUTOMATED COUNT: 12.4 % (ref 12.3–15.4)
GP B STREP DNA SPEC QL NAA+PROBE: POSITIVE
HCT VFR BLD AUTO: 28.7 % (ref 34–46.6)
HGB BLD-MCNC: 9.4 G/DL (ref 12–15.9)
IMM GRANULOCYTES # BLD AUTO: 0.36 10*3/MM3 (ref 0–0.05)
IMM GRANULOCYTES NFR BLD AUTO: 3.1 % (ref 0–0.5)
LYMPHOCYTES # BLD AUTO: 2.25 10*3/MM3 (ref 0.7–3.1)
LYMPHOCYTES NFR BLD AUTO: 19.1 % (ref 19.6–45.3)
MCH RBC QN AUTO: 29.6 PG (ref 26.6–33)
MCHC RBC AUTO-ENTMCNC: 32.8 G/DL (ref 31.5–35.7)
MCV RBC AUTO: 90.3 FL (ref 79–97)
MONOCYTES # BLD AUTO: 1.02 10*3/MM3 (ref 0.1–0.9)
MONOCYTES NFR BLD AUTO: 8.6 % (ref 5–12)
NEUTROPHILS NFR BLD AUTO: 67.7 % (ref 42.7–76)
NEUTROPHILS NFR BLD AUTO: 7.99 10*3/MM3 (ref 1.7–7)
NRBC BLD AUTO-RTO: 0 /100 WBC (ref 0–0.2)
ORGANISM ID: ABNORMAL
PLATELET # BLD AUTO: 267 10*3/MM3 (ref 140–450)
PMV BLD AUTO: 9.9 FL (ref 6–12)
RBC # BLD AUTO: 3.18 10*6/MM3 (ref 3.77–5.28)
WBC NRBC COR # BLD AUTO: 11.8 10*3/MM3 (ref 3.4–10.8)

## 2024-06-08 PROCEDURE — 85025 COMPLETE CBC W/AUTO DIFF WBC: CPT | Performed by: OBSTETRICS & GYNECOLOGY

## 2024-06-08 PROCEDURE — 0503F POSTPARTUM CARE VISIT: CPT | Performed by: OBSTETRICS & GYNECOLOGY

## 2024-06-08 PROCEDURE — 25010000002 KETOROLAC TROMETHAMINE PER 15 MG: Performed by: OBSTETRICS & GYNECOLOGY

## 2024-06-08 RX ADMIN — ACETAMINOPHEN 1000 MG: 500 TABLET ORAL at 06:08

## 2024-06-08 RX ADMIN — OXYCODONE HYDROCHLORIDE 5 MG: 5 TABLET ORAL at 19:28

## 2024-06-08 RX ADMIN — DOCUSATE SODIUM 100 MG: 100 CAPSULE, LIQUID FILLED ORAL at 21:02

## 2024-06-08 RX ADMIN — ACETAMINOPHEN 325MG 650 MG: 325 TABLET ORAL at 23:50

## 2024-06-08 RX ADMIN — IBUPROFEN 600 MG: 600 TABLET, FILM COATED ORAL at 21:02

## 2024-06-08 RX ADMIN — OXYCODONE HYDROCHLORIDE 5 MG: 5 TABLET ORAL at 14:27

## 2024-06-08 RX ADMIN — KETOROLAC TROMETHAMINE 15 MG: 15 INJECTION, SOLUTION INTRAMUSCULAR; INTRAVENOUS at 09:06

## 2024-06-08 RX ADMIN — KETOROLAC TROMETHAMINE 15 MG: 15 INJECTION, SOLUTION INTRAMUSCULAR; INTRAVENOUS at 14:27

## 2024-06-08 RX ADMIN — ACETAMINOPHEN 1000 MG: 500 TABLET ORAL at 12:23

## 2024-06-08 RX ADMIN — ACETAMINOPHEN 325MG 650 MG: 325 TABLET ORAL at 17:49

## 2024-06-08 RX ADMIN — KETOROLAC TROMETHAMINE 15 MG: 15 INJECTION, SOLUTION INTRAMUSCULAR; INTRAVENOUS at 03:41

## 2024-06-08 RX ADMIN — SIMETHICONE 80 MG: 80 TABLET, CHEWABLE ORAL at 21:02

## 2024-06-08 NOTE — PROGRESS NOTES
"Discharge Planning Assessment  [facility]     Patient Name: Obie Jensen  MRN: [id number]  Today's Date: 6/8/2024    Admit Date: 6/6/2024    Plan: Infant may discharge to mother when medically ready. CSW will follow cord tox. AMBROSIO GuerraW   Discharge Needs Assessment    No documentation.                  Discharge Plan       Row Name 06/08/24 1337       Plan    Plan Infant may discharge to mother when medically ready. CSW will follow cord tox. AMBROSIO GuerraW    Plan Comments Mother: Obie Jensen, MRN [id number]; Infant: Valente \"Da'zi\" Camila, MRN [id number]. CSW was consulted for \"Mom's history of THC use\". Of note, mother was positive for THC prenatally on 11/20/23 and positive for THC on admission 6/7/24; infant's UDS was missed, and cord toxicology has been sent. CSW met with mother alone at bedside. Mother verified her address, phone number, and insurance. A [employer] rep had not met with mother to add infant to Medicaid yet. Mother reports having a car seat, crib/bassinet, clothes, and diapers for infant. This is mother's first child. Mother's support system includes FOB/SO and maternal grandmother of infant. [facility], mother is comfortable scheduling appointments for infant, and has reliable transportation. Mother is current with [facility], and aware of how to add infant to her plan. CSW also provided the [facility] Referral Form to mother. CSW discussed mother's positive UDS, infant's cord toxicology, and mandated reporting to CPS if warranted. Mother shared she last used THC 1-2 weeks ago due to lack of appetite during pregnancy. Mother voiced understanding and asked appropriate questions about what to expect if infant's cord toxicology is positive for THC or other substances. CSW spent time building rapport with mother and offered validation, support, and encouragement to them throughout the assessment. CSW provided a packet of resources to mother including: [place], HANDS, " transportation, infant supplies, counseling, online support groups, postpartum mood and anxiety resources, and general community resources. Mother was polite and cooperative throughout the assessment, and denied having unmet needs at this time. CSW will follow infant's cord toxicology, and complete mandated reporting to CPS if warranted. Desire LEE, CSW                  Continued Care and Services - Admitted Since 6/6/2024    No active coordination exists for this encounter.       Selected Continued Care - Episodes Includes continued care and service providers with selected services from the active episodes listed below      [facility] Episode start date: 3/6/2024   There are no active outsourced providers for this episode.                    Demographic Summary       Row Name 06/08/24 1336       General Information    Admission Type inpatient    Arrived From home    Referral Source nursing    Reason for Consult psychosocial concerns;substance use concerns;community resources    General Information Comments THC use prenatally, resources/support                   Functional Status       Row Name 06/08/24 1336       Mental Status    General Appearance WDL WDL                   Psychosocial       Row Name 06/08/24 1336       Behavior WDL    Behavior WDL WDL       Emotion Mood WDL    Emotion/Mood/Affect WDL WDL       Speech WDL    Speech WDL WDL       Perceptual State WDL    Perceptual State WDL WDL       Thought Process WDL    Thought Process WDL WDL       Intellectual Performance WDL    Intellectual Performance WDL WDL       Coping/Stress    Major Change/Loss/Stressor birth    Patient Personal Strengths able to adapt;flexibility;motivated;positive attitude;strong support system    Sources of Support parent(s);significant other                   Abuse/Neglect    No documentation.                  Legal    No documentation.                  Substance Abuse       Row Name 06/08/24 1337       Substance Use     Substance Use Comment mom reports using THC due to lack of appetite, last use was 1-2 weeks ago                   Patient Forms    No documentation.                     SUJIT Ascencio

## 2024-06-08 NOTE — PROGRESS NOTES
"Subjective:  Postpartum Day 1:     The patient feels well.  Pain is well controlled with current medications. The baby is well.  Urinary output is adequate. The patient is ambulating well. The patient is tolerating a normal diet. Flatus has been passed.      Objective:    Vital signs in last 24 hours:  Blood pressure 117/80, pulse 77, temperature 98 °F (36.7 °C), temperature source Oral, resp. rate 17, height 165.1 cm (65\"), weight 94.3 kg (208 lb), last menstrual period 09/21/2023, SpO2 100%, currently breastfeeding.      General:    alert, appears stated age, and cooperative   Uterine Fundus:   firm   Incision:  healing well, no significant drainage, no dehiscence, no significant erythema     Labs    Hgb 11.5 --> 9.4    Male infant / Rh positive    Assessment/Plan:.     Postpartum Day #1  - C/S.  Patient making normal postpartum milestones with good pain control.    - Male infant.  Discussed circumcision.  Discussed nature of procedure.  Discussed elective nature of circumcision as well as risks including bleeding, infection and aesthetics.  Patient voices understanding and agrees to proceed.  - Anemia.  Continue vitamins with iron postpartum    Melquiades Feliciano MD     "

## 2024-06-08 NOTE — PLAN OF CARE
Goal Outcome Evaluation:              Outcome Evaluation: VSS. Voiding without difficulty.

## 2024-06-08 NOTE — PLAN OF CARE
Goal Outcome Evaluation:  Plan of Care Reviewed With: patient        Progress: improving  Outcome Evaluation: VSS, assessment WDL, pain controlled, catheter removed and patient voided, bonding with infant

## 2024-06-09 VITALS
RESPIRATION RATE: 17 BRPM | DIASTOLIC BLOOD PRESSURE: 78 MMHG | OXYGEN SATURATION: 100 % | WEIGHT: 208 LBS | BODY MASS INDEX: 34.66 KG/M2 | HEIGHT: 65 IN | TEMPERATURE: 97.8 F | HEART RATE: 76 BPM | SYSTOLIC BLOOD PRESSURE: 119 MMHG

## 2024-06-09 PROCEDURE — 0503F POSTPARTUM CARE VISIT: CPT | Performed by: OBSTETRICS & GYNECOLOGY

## 2024-06-09 RX ORDER — FERROUS SULFATE 325(65) MG
325 TABLET ORAL
Qty: 30 TABLET | Refills: 1 | Status: SHIPPED | OUTPATIENT
Start: 2024-06-09

## 2024-06-09 RX ORDER — OXYCODONE HYDROCHLORIDE 5 MG/1
5 TABLET ORAL EVERY 8 HOURS PRN
Qty: 9 TABLET | Refills: 0 | Status: SHIPPED | OUTPATIENT
Start: 2024-06-09

## 2024-06-09 RX ORDER — IBUPROFEN 600 MG/1
600 TABLET ORAL EVERY 6 HOURS
Qty: 50 TABLET | Refills: 3 | Status: SHIPPED | OUTPATIENT
Start: 2024-06-09

## 2024-06-09 RX ADMIN — IBUPROFEN 600 MG: 600 TABLET, FILM COATED ORAL at 09:57

## 2024-06-09 RX ADMIN — OXYCODONE HYDROCHLORIDE 10 MG: 10 TABLET ORAL at 05:30

## 2024-06-09 RX ADMIN — Medication 1 TABLET: at 09:57

## 2024-06-09 RX ADMIN — ACETAMINOPHEN 325MG 650 MG: 325 TABLET ORAL at 05:30

## 2024-06-09 NOTE — PROGRESS NOTES
Section Progress Note    Assessment & Plan     Status post  section: Doing well postoperatively.     1) postpartum care immediately after delivery: doing well, ready for discharge if okay with nursery   2) Anemia postpartum - acute loss with delivery Hg from 11.6 g to 9.4 g.  Home on iron.     Rh status: A positive   Syphilis screen delivery admit: NR  Rubella: immune  Gender: Male     Subjective     Postpartum Day 2:  Delivery    The patient feels well. The patient denies emotional concerns. Pain is well controlled with current medications. The baby is well. Urinary output is adequate. The patient is ambulating well. The patient is tolerating a normal diet. Patient reports flatus.    Objective     Vital signs in last 24 hours:  Temp:  [97.8 °F (36.6 °C)-98.5 °F (36.9 °C)] 97.8 °F (36.6 °C)  Heart Rate:  [76-78] 76  Resp:  [17] 17  BP: (119-132)/(77-85) 119/78      General:    alert, appears stated age, and cooperative   Bowel Sounds:  active   Lochia:  appropriate   Uterine Fundus:   firm   Incision:  healing well, no significant drainage, no dehiscence, no significant erythema   DVT Evaluation:  No evidence of DVT seen on physical exam.     Lab Results   Component Value Date    WBC 11.80 (H) 2024    HGB 9.4 (L) 2024    HCT 28.7 (L) 2024    MCV 90.3 2024     2024         Alex Lorenz MD  2024  11:49 EDT

## 2024-06-09 NOTE — DISCHARGE SUMMARY
Date of Discharge:  2024    Discharge Diagnosis: 1) postpartum care immediately after delivery     Presenting Problem/History of Present Illness  Breech presentation with  problem, single or unspecified fetus [O32.1XX0]  Pregnancy [Z34.90]  Breech presentation of fetus [O32.1XX0]       Hospital Course  Patient is a 24 y.o. female  37w1d presented from [facility] for decreased NOHEMI, known IUGR at term.  Repeat testing confirmed the next day so underwent primary  for Breech.  For events surrounding her delivery please see delivery/op note.  Her postpartum course was uneventful and today Hg 9.4 g, she is ready for discharge.  She meets all milestones and criteria for discharge and instructions were reviewed and she voiced understanding.     Procedures Performed  Procedure(s):   SECTION PRIMARY       Consults:   Consults       No orders found from 2024 to 2024.            Pertinent Test Results: Hg 9.4 g    Condition on Discharge:  Stable     Discharge Disposition  Home or Self Care    Discharge Medications     Discharge Medications        New Medications        Instructions Start Date   ferrous sulfate 325 (65 FE) MG tablet   325 mg, Oral, Daily With Breakfast      ibuprofen 600 MG tablet  Commonly known as: ADVIL,MOTRIN   600 mg, Oral, Every 6 Hours      oxyCODONE 5 MG immediate release tablet  Commonly known as: ROXICODONE   5 mg, Oral, Every 8 Hours PRN             Continue These Medications        Instructions Start Date   Prenatal Gummies 0.18-25 MG chewable tablet   1 each, Oral, Daily             Stop These Medications      ondansetron ODT 4 MG disintegrating tablet  Commonly known as: ZOFRAN-ODT              Discharge Diet:   Diet Instructions       Diet: Regular/House Diet; Regular Texture (IDDSI 7); Thin (IDDSI 0)      Discharge Diet: Regular/House Diet    Texture: Regular Texture (IDDSI 7)    Fluid Consistency: Thin (IDDSI 0)            Activity at Discharge:   Activity  Instructions       Other Instructions (Specify)      No driving or tub baths for 2 weeks, No heavy lifting and pelvic rest for 6 weeks     Pelvic Rest              Follow-up Appointments  Future Appointments   Date Time Provider Department [place]   6/11/2024  9:20 AM  JON[facility] ([place], [place]) [facility]   6/11/2024  9:45 AM Yousif Guerrero MD [facility]   6/27/2024  9:15 AM Yousif Guerrero MD [facility]     Additional Instructions for the Follow-ups that You Need to Schedule       Discharge Follow-up with Specialty: Dr. Guerrero; 2 Weeks   As directed      Specialty: Dr. Guerrero   Follow Up: 2 Weeks   Follow Up Details: post op                Test Results Pending at Discharge  Pending Labs       Order Current Status    THC (marijuana), Urine, Confirmation - Urine, Clean Catch In process             Alex Lorenz MD  06/09/24  11:55 EDT

## 2024-06-09 NOTE — PLAN OF CARE
Goal Outcome Evaluation:  Plan of Care Reviewed With: patient        Progress: improving  Outcome Evaluation: VSS, assessment WDL, pain controlled, bonding with infant

## 2024-06-09 NOTE — PLAN OF CARE
Goal Outcome Evaluation:      Patient education complete. Patient ready for discharge to home with .

## 2024-06-11 ENCOUNTER — TELEPHONE (OUTPATIENT)
Dept: OBSTETRICS AND GYNECOLOGY | Facility: CLINIC | Age: 24
End: 2024-06-11
Payer: MEDICAID

## 2024-06-11 LAB
CANNABINOIDS UR QL CFM: 52 NG/ML
CANNABINOIDS UR QL CFM: POSITIVE
LABORATORY COMMENT REPORT: ABNORMAL

## 2024-06-11 NOTE — TELEPHONE ENCOUNTER
Caller: Obie Jensen    Relationship: Self    Best call back number: [phone]    What was the call regarding: PT WOULD LIKE TO KNOW WHAT SHE NEEDS TO DO/TAKE TO DRY UP HER MILK SUPPLY

## 2024-06-17 ENCOUNTER — PATIENT OUTREACH (OUTPATIENT)
Dept: LABOR AND DELIVERY | Facility: HOSPITAL | Age: 24
End: 2024-06-17
Payer: MEDICAID

## 2024-06-17 NOTE — OUTREACH NOTE
Motherhood Connection  Unable to Reach       Questions/Answers      Flowsheet Row Responses   Pending Outreach Postpartum Check-in   Call Attempt First   Outcome Left message            Left vm, encouraged to reach out with any needs. Will call again at a later date.      Forrest Sinha RN  Maternity Nurse Navigator    6/17/2024, 13:34 EDT

## 2024-06-19 ENCOUNTER — PATIENT OUTREACH (OUTPATIENT)
Dept: LABOR AND DELIVERY | Facility: HOSPITAL | Age: 24
End: 2024-06-19
Payer: MEDICAID

## 2024-06-19 NOTE — OUTREACH NOTE
Motherhood Connection  Postpartum Check-In    Questions/Answers      Flowsheet Row Responses   Visit Setting Telephone   Best Method for Contacting Cell   OB Discharge Note Reviewed  Reviewed   OB Discharge Navigator Reviewed  Reviewed   OB Discharge Medications Reviewed  Reviewed    discharged home with mother? Yes   Current Pain Levels 0-10 0   At Rest Pain Levels 0-10 0   Pain level with activity 0-10 0   Verbalized Emotional State Acceptance   Family/Support Network Significant Other   Level of Involvement in Care Attentive, Interactive, Supportive   Do you feel comfortable in your relationship with your baby? Yes   Have members of your household adjusted to your baby? Yes   Is the baby's father supportive and/or involved with the baby? Yes   How does your partner feel about the baby? Happy, Involved   Do you feel safe at home, school and work? Yes   Are you in a relationship with someone who threatens you or hurts you? No   Do you have the resources to keep yourself and your baby healthy and safe? Yes   Lochia (per patient report) Rubra, Brown-kim Red   Amount Scant   Number of pads per day 4   Lochia Odor None   Is patient breastfeeding? No   How is breast suppression going? good   Postpartum Depression Screening Education Education Provided   Doctor Appointments: Education Provided   Breastfeeding Education Education Provided   Family Planning Education Education Provided   Postpartum Care Education Education Provided   S & S to report Education Provided   Followup Appointments Made Yes   Well Child Visit Appointments Made Yes   Appointment Date 24   Provider/Agency [facility]   Well Child Checkup Provider Name GILBERT   Well Child Check Up Date: 06/10/24   Did you complete the visit? Yes   Were there any specific concerns? No   Umbilical Cord No reported signs or symptoms   Was the baby circumcised? Yes   Circumcision care and signs/symptoms to report Reviewed   Infant Feeding Method Formula   Is a  "lactation referral indicated? No   Formula Type Other   Other Formula Neosure   Formula PO (mL) 1-2oz   Formula/Expressed Milk frequency of feedings: q2-3h   Number of wet diapers x 24 hours 12   Last BM x 24 hours 1   What safe sleep surface is available? Bassinet   Are there stuffed animals, toys, pillows, quilts, blankets, wedges, positioners, bumpers or other loose bedding in the infant's sleeping environment? No   Where does the baby usually sleep? Bassinet   Does the baby ever share a sleep surface with a sibling, adult or pet? No   Does the baby ever share a sleep surface in a bed, couch, recliner or other? No   What position do you place your baby to sleep for naps? Back   What position do you place your baby to sleep at night Back   Are you and/or other caregivers smoking inside or outside the baby's home? No   Is the infant dressed appropiately for the temperature of the home? Yes   Do you use a clean, dry pacifier that is not attached to a string or stuffed animal? No            Review of Systems    Most Recent Manning  Depression Scale Score (EPDS)    Performed by a clinician: 0 (2024  1:10 PM)    Received via [facility] questionnaire:  ()     5 Ps Screen  complete    Pt doing well.  Has all supplies.  [facility] for Monday.  FOB for help at home.  Formula feeding, on Neosure now, plans to change to Advanced after this can is finished.  Bbay pooping atleast daily but noted to be hard bm, enc \"bicycling legs\" to help.  Discussed use of colace and splinting incision.  [place] and PP Maternal warning s/s reviewed, pt verbalized understanding.  Grad letter sent.  Chart to call center.    Forrest Sinha RN  Maternity Nurse Navigator    2024, 13:15 EDT    "

## 2024-06-19 NOTE — OUTREACH NOTE
Motherhood Connection  Unable to Reach       Questions/Answers      Flowsheet Row Responses   Pending Outreach Postpartum Check-in   Call Attempt Second   Outcome Left message, MyChart message sent to patient            Left vm, encouraged to reach out with any needs. Will call again at a later date.      Forrest Sinha RN  Maternity Nurse Navigator    6/19/2024, 09:25 EDT

## 2024-06-26 ENCOUNTER — PATIENT OUTREACH (OUTPATIENT)
Dept: CALL CENTER | Facility: HOSPITAL | Age: 24
End: 2024-06-26
Payer: MEDICAID

## 2024-06-26 NOTE — OUTREACH NOTE
Motherhood Connection Survey      Flowsheet Row Responses   [facility] facility patient discharged from? [place]   Week 1 attempt successful? No   Unsuccessful attempts Attempt 1   Reschedule Today              Robby GREGORY - Registered Nurse

## 2024-06-26 NOTE — OUTREACH NOTE
Motherhood Connection Survey      Flowsheet Row Responses   [facility] facility patient discharged from? [place]   Week 1 attempt successful? No   Unsuccessful attempts Attempt 2   Reschedule Tomorrow              Robby GREGORY - Registered Nurse

## 2024-06-27 ENCOUNTER — PATIENT OUTREACH (OUTPATIENT)
Dept: CALL CENTER | Facility: HOSPITAL | Age: 24
End: 2024-06-27
Payer: MEDICAID

## 2024-06-27 ENCOUNTER — PATIENT MESSAGE (OUTPATIENT)
Dept: OBSTETRICS AND GYNECOLOGY | Facility: CLINIC | Age: 24
End: 2024-06-27

## 2024-06-27 ENCOUNTER — OFFICE VISIT (OUTPATIENT)
Dept: OBSTETRICS AND GYNECOLOGY | Facility: CLINIC | Age: 24
End: 2024-06-27
Payer: MEDICAID

## 2024-06-27 VITALS
WEIGHT: 188 LBS | HEART RATE: 79 BPM | BODY MASS INDEX: 31.32 KG/M2 | SYSTOLIC BLOOD PRESSURE: 113 MMHG | HEIGHT: 65 IN | DIASTOLIC BLOOD PRESSURE: 74 MMHG

## 2024-06-27 DIAGNOSIS — Z09 POSTOPERATIVE EXAMINATION: Primary | ICD-10-CM

## 2024-06-27 PROCEDURE — 99024 POSTOP FOLLOW-UP VISIT: CPT | Performed by: OBSTETRICS & GYNECOLOGY

## 2024-06-27 NOTE — OUTREACH NOTE
Motherhood Connection Survey      Flowsheet Row Responses   [facility]   Week 1 attempt successful? Yes   Call start time 1229   Call end time 1236   Baby sex Boy   [place] discharged home with mother? Yes   Baby sex Boy   Delivery type    Emotional state Acceptance   Family support Yes   Do you have all necessary resources to care for you and your baby?  Yes   Have members of your household adjusted to your baby? Yes   Did you have any problems with pre-eclampsia during this pregnancy? No   Do you have any of the following: No Issues   Did you have blood glucose issues during this pregnancy No   Lochia amount None   Did you have an episiotomy/tear/abdominal incision? Yes   Feeding Method Bottle   Frequency every 1-2.5hrs   Amount 1-4ozs   Breast Condition No   Nipple Condition No   Nursing Interventions Supportive bra   Signs baby is ready to eat Crying   Number of wet diapers x 24 hours alot, almost 20   Last BM x 24 hours 1   Umbilical Cord No reported signs or symptoms   Was the baby circumcised? Yes   Circumcision care and signs/symptoms to report Reviewed   Where does the baby usually sleep? Bassinet   Are there stuffed animals, toys, pillows, quilts, blankets, wedges, positioners, bumpers or other loose bedding in the infant's sleeping environment? No   Does the baby ever share a sleep surface in a bed, couch, recliner or other? Yes  [sometimes during a nap]   What position do you lay your baby down to sleep? Back   Are you and/or other caregivers smoking inside or outside the baby's home? No   Mom appointment comments: in 4weeks   Baby appointment comments: unsure   Call completed? Yes   How satisfied were you with the Motherhood Connection Program? 5   Anyone you would like to recognize from your time in the Motherhood Connection Program Elizabeth Hudson S - Registered Nurse

## 2024-06-27 NOTE — PROGRESS NOTES
"Chief Complaint  Postpartum Care (Here for postpartum visit 2 weeks after )    Subjective        Obie Jensen presents to [facility]  History of Present Illness  Patient is here for postoperative appointment.  She is 2 weeks after primary  for breech presentation and growth restriction.  She says physically she is feeling well.  Her pain has been well-controlled now only requiring over-the-counter pain medication sporadically.  She is not having any bleeding at this time.  She is not breast-feeding.  She says the baby is doing well.  She does report that she has good support at home.  Patient states that she has noticed feeling down lately.  She denies any previous history of depression.  She says she feels weepy and sad at times for no reason.  She also reports problems with sleep.  Says she only can sleep for couple hours at a time.  She also reports minimal appetite.  She says she is drinking adequate fluids.  She denies any thoughts of wanting to harm herself or harm anyone else including the baby.  She says she feels she is coping well, but she has never experienced this type of depressed mood in the past.    The following portions of the patient's history were reviewed and updated as appropriate: allergies, current medications, past family history, past medical history, past social history, past surgical history, and problem list.    Objective   Vital Signs:  /74   Pulse 79   Ht 165.1 cm (65\")   Wt 85.3 kg (188 lb)   BMI 31.28 kg/m²   Estimated body mass index is 31.28 kg/m² as calculated from the following:    Height as of this encounter: 165.1 cm (65\").    Weight as of this encounter: 85.3 kg (188 lb).             Physical Exam   General: No acute distress, awake and oriented x3  Abdomen: Soft, nontender, nondistended  Incision: Clean, dry, intact and healing well  Psychiatric: good judgment and insight, normal mood  Neurological: cranial nerves II " through XII intact, no deficits    Result Review :                     Assessment and Plan     Diagnoses and all orders for this visit:    1. Postoperative examination (Primary)    From a physical standpoint, patient is doing very well.  She may resume light activities.  She is advised to maintain pelvic rest, no heavy lifting, no strenuous activity until least 6 weeks postpartum.  The patient states that she would like to start on birth control pills around her 6-week postpartum visit.    2. Mild postpartum depression    We discussed the patient's symptoms.  Symptoms really seem to be more consistent with a mild or postpartum depression, more consistent with postpartum blues.  She does not appear to be a threat to herself or others.  She has never previously been treated for depression and states there is no significant family history of depression.  We have discussed area resources such as therapy and counseling.  I have provided her with a list of area resources.  She says that she will look into this.  We have discussed pharmacologic therapy for postpartum depression.  Patient states that she does not feel that this is warranted currently.  Advised the patient to contact us if she feels her symptoms are worsening.  Otherwise I will plan to see her back in 4 weeks.         Follow Up     Return in about 4 weeks (around 7/25/2024).  Patient was given instructions and counseling regarding her condition or for health maintenance advice. Please see specific information pulled into the AVS if appropriate.

## (undated) DEVICE — GLV SURG BIOGEL LTX PF 7

## (undated) DEVICE — SOL IRR H2O BTL 1000ML STRL

## (undated) DEVICE — SUT MNCRYL 0/0 CTX 36IN Y398H

## (undated) DEVICE — ADHS SKIN PREMIERPRO EXOFIN TOPICAL HI/VISC .5ML

## (undated) DEVICE — ANTIBACTERIAL UNDYED BRAIDED (POLYGLACTIN 910), SYNTHETIC ABSORBABLE SUTURE: Brand: COATED VICRYL

## (undated) DEVICE — SUT MNCRYL 3/0 CT1 36 IN Y944H